# Patient Record
Sex: FEMALE | Race: WHITE
[De-identification: names, ages, dates, MRNs, and addresses within clinical notes are randomized per-mention and may not be internally consistent; named-entity substitution may affect disease eponyms.]

---

## 2017-01-29 ENCOUNTER — HOSPITAL ENCOUNTER (EMERGENCY)
Dept: HOSPITAL 17 - PHED | Age: 67
Discharge: HOME | End: 2017-01-29
Payer: MEDICARE

## 2017-01-29 VITALS
RESPIRATION RATE: 20 BRPM | HEART RATE: 84 BPM | OXYGEN SATURATION: 95 % | TEMPERATURE: 98.6 F | SYSTOLIC BLOOD PRESSURE: 151 MMHG | DIASTOLIC BLOOD PRESSURE: 84 MMHG

## 2017-01-29 VITALS
RESPIRATION RATE: 18 BRPM | HEART RATE: 74 BPM | OXYGEN SATURATION: 94 % | SYSTOLIC BLOOD PRESSURE: 124 MMHG | DIASTOLIC BLOOD PRESSURE: 66 MMHG

## 2017-01-29 VITALS — BODY MASS INDEX: 44.53 KG/M2 | HEIGHT: 63 IN | WEIGHT: 251.33 LBS

## 2017-01-29 DIAGNOSIS — Z85.820: ICD-10-CM

## 2017-01-29 DIAGNOSIS — Z86.711: ICD-10-CM

## 2017-01-29 DIAGNOSIS — Z79.01: ICD-10-CM

## 2017-01-29 DIAGNOSIS — B96.1: ICD-10-CM

## 2017-01-29 DIAGNOSIS — Z79.899: ICD-10-CM

## 2017-01-29 DIAGNOSIS — Z85.42: ICD-10-CM

## 2017-01-29 DIAGNOSIS — N30.00: ICD-10-CM

## 2017-01-29 DIAGNOSIS — J09.X2: Primary | ICD-10-CM

## 2017-01-29 LAB
ALP SERPL-CCNC: 89 U/L (ref 45–117)
ALT SERPL-CCNC: 18 U/L (ref 10–53)
ANION GAP SERPL CALC-SCNC: 7 MEQ/L (ref 5–15)
APTT BLD: 34.2 SEC (ref 24.3–30.1)
AST SERPL-CCNC: 34 U/L (ref 15–37)
BACTERIA #/AREA URNS HPF: (no result) /HPF
BASOPHILS # BLD AUTO: 0.1 TH/MM3 (ref 0–0.2)
BASOPHILS NFR BLD: 1.4 % (ref 0–2)
BILIRUB SERPL-MCNC: 0.3 MG/DL (ref 0.2–1)
BUN SERPL-MCNC: 18 MG/DL (ref 7–18)
CHLORIDE SERPL-SCNC: 108 MEQ/L (ref 98–107)
COLOR UR: YELLOW
COMMENT (UR): (no result)
CULTURE IF INDICATED: (no result)
EOSINOPHIL # BLD: 0 TH/MM3 (ref 0–0.4)
EOSINOPHIL NFR BLD: 0.6 % (ref 0–4)
ERYTHROCYTE [DISTWIDTH] IN BLOOD BY AUTOMATED COUNT: 13.6 % (ref 11.6–17.2)
GFR SERPLBLD BASED ON 1.73 SQ M-ARVRAT: 86 ML/MIN (ref 89–?)
GLUCOSE UR STRIP-MCNC: (no result) MG/DL
HCO3 BLD-SCNC: 27.6 MEQ/L (ref 21–32)
HCT VFR BLD CALC: 38.7 % (ref 35–46)
HEMO FLAGS: (no result)
HGB UR QL STRIP: (no result)
INR PPP: 1.8 RATIO
KETONES UR STRIP-MCNC: (no result) MG/DL
LYMPHOCYTES # BLD AUTO: 1.7 TH/MM3 (ref 1–4.8)
LYMPHOCYTES NFR BLD AUTO: 44.3 % (ref 9–44)
MCH RBC QN AUTO: 29.3 PG (ref 27–34)
MCHC RBC AUTO-ENTMCNC: 34.2 % (ref 32–36)
MCV RBC AUTO: 85.9 FL (ref 80–100)
METHOD OF COLLECTION: (no result)
MONOCYTES NFR BLD: 13 % (ref 0–8)
NEUTROPHILS # BLD AUTO: 1.5 TH/MM3 (ref 1.8–7.7)
NEUTROPHILS NFR BLD AUTO: 40.7 % (ref 16–70)
NITRITE UR QL STRIP: (no result)
PLATELET # BLD: 249 TH/MM3 (ref 150–450)
POTASSIUM SERPL-SCNC: 3.9 MEQ/L (ref 3.5–5.1)
PROTHROMBIN TIME: 20.7 SEC (ref 9.8–11.6)
RBC # BLD AUTO: 4.5 MIL/MM3 (ref 4–5.3)
RBC #/AREA URNS HPF: (no result) /HPF (ref 0–3)
SODIUM SERPL-SCNC: 143 MEQ/L (ref 136–145)
SP GR UR STRIP: 1.02 (ref 1–1.03)
SQUAMOUS #/AREA URNS HPF: (no result) /HPF (ref 0–5)
WBC # BLD AUTO: 3.8 TH/MM3 (ref 4–11)

## 2017-01-29 PROCEDURE — 81001 URINALYSIS AUTO W/SCOPE: CPT

## 2017-01-29 PROCEDURE — 96360 HYDRATION IV INFUSION INIT: CPT

## 2017-01-29 PROCEDURE — 71020: CPT

## 2017-01-29 PROCEDURE — 80053 COMPREHEN METABOLIC PANEL: CPT

## 2017-01-29 PROCEDURE — 87186 SC STD MICRODIL/AGAR DIL: CPT

## 2017-01-29 PROCEDURE — 85730 THROMBOPLASTIN TIME PARTIAL: CPT

## 2017-01-29 PROCEDURE — 87077 CULTURE AEROBIC IDENTIFY: CPT

## 2017-01-29 PROCEDURE — 87086 URINE CULTURE/COLONY COUNT: CPT

## 2017-01-29 PROCEDURE — 93005 ELECTROCARDIOGRAM TRACING: CPT

## 2017-01-29 PROCEDURE — 87040 BLOOD CULTURE FOR BACTERIA: CPT

## 2017-01-29 PROCEDURE — 85025 COMPLETE CBC W/AUTO DIFF WBC: CPT

## 2017-01-29 PROCEDURE — 99284 EMERGENCY DEPT VISIT MOD MDM: CPT

## 2017-01-29 PROCEDURE — 85610 PROTHROMBIN TIME: CPT

## 2017-01-29 PROCEDURE — 87804 INFLUENZA ASSAY W/OPTIC: CPT

## 2017-01-29 NOTE — EKG
Date Performed: 01/29/2017       Time Performed: 14:41:10

 

PTAGE:      66 years

 

EKG:      Sinus rhythm 

 

 Normal ECG

 

PREVIOUS TRACING       : 01/08/2015 13.38 No significant change from previous tracing noted.

 

DOCTOR:   Yeison Meek  Interpretating Date/Time  01/29/2017 17:53:20

## 2017-01-29 NOTE — PD
HPI


Chief Complaint:  Cold / Flu Symptoms


Time Seen by Provider:  13:59


Travel History


International Travel<30 days:  No


Contact w/Intl Traveler<30days:  No


Traveled to known affect area:  No





History of Present Illness


HPI


Ms. Nugent is a pleasant 66 year old female who presents to ER with c/o of cough/

congestion and overall "not feeling well" for the past week (5 days ago).  

Patient reports that all her symptoms began last week after her her  

passed. Reports that she had alot of family members come in from out of town 

for the [facility] service and reports that some of her family members were sick. 

Reports that alot of her family member had cough/congestion and patient thinks 

that someone must have gotten her sick. Reports no fever/chills. Reports no 

chest pain or sob. Reports that she has been coughing - reports cough as 

nonproductive.  Reports "i just feel like my chest is congested."





PFSH


Past Medical History


Arthritis:  Yes


Asthma:  No


Blood Disorders:  No


Anxiety:  No


Depression:  Yes


Heart Rhythm Problems:  No


Cancer:  Yes (MELANOMA 2015, ENDOMETRIAL CANCER, COMPLETE HYSTERECTOMY 2009)


Cardiovascular Problems:  No


High Cholesterol:  No


Chemotherapy:  No


Chest Pain:  No


Congestive Heart Failure:  No


COPD:  No


Cerebrovascular Accident:  No


Diabetes:  No


Diminished Hearing:  No


Endocrine:  No


Gastrointestinal Disorders:  Yes


GERD:  No


Glaucoma:  No


Genitourinary:  Yes (HX OF UTI'S)


Headaches:  No


Hepatitis:  No


Hiatal Hernia:  Yes (HX OF, CAUSES NO PROBLEMS)


Hypertension:  No


Immune Disorder:  No


Implanted Vascular Access Dvce:  No


Kidney Stones:  No


Medical other:  Yes (LESIONS REMOVED FROM VULVA JAN. 2015)


Musculoskeletal:  Yes (ARTHRITIS)


Neurologic:  Yes (HX OF VERTIGO)


Psychiatric:  No


Reproductive:  No


Respiratory:  Yes (HX OF MASSIVE PULMONARY EMBOLISM  2001, RECURRED WHEN OFF 

COUMADIN 2014)


Migraines:  No


Myocardial Infarction:  No


Radiation Therapy:  No


Renal Failure:  No


Seizures:  No


Sleep Apnea:  No


Thyroid Disease:  Yes (HX OF HYPOTHYROID, NOT CURRENT)


Ulcer:  No


Menopausal:  Yes


Dilation and Curettage (D&C):  Yes (2002 2009)





Past Surgical History


Abdominal Surgery:  Yes (UMBILICAL HERNIA REPAIR 2000)


AICD:  No


Appendectomy:  No


Arteriovenous Shunt:  No


Body Medical Devices:  PESARY


Cardiac Surgery:  No


Cholecystectomy:  No


Ear Surgery:  No


Endocrine Surgery:  No


Eye Surgery:  Yes (CATARACTS REMOVED 2014)


Genitourinary Surgery:  Yes (LESIONS REMOVED FROM VULVA JAN 2015)


Gynecologic Surgery:  Yes (TOTAL HYSTERECTOMY, D & C 2002; D&C 5/09)


Hysterectomy:  Yes


Insulin Pump:  No


Joint Replacement:  Yes (L HIP)


Neurologic Surgery:  No


Oral Surgery:  No


Pacemaker:  No


Thoracic Surgery:  No


Other Surgery:  Yes





Social History


Alcohol Use:  No


Tobacco Use:  No


Substance Use:  No





Allergies-Medications


(Allergen,Severity, Reaction):  


Coded Allergies:  


     No Known Allergies (Verified , 12/2/15)


Reported Meds & Prescriptions





Reported Meds & Active Scripts


Active


Reported


Warfarin 3 Mg Tab 3.5 Mg PO DAILY


Simvastatin 10 Mg Tab 10 Mg PO DAILY


Lisinopril 5 Mg Tab 5 Mg PO DAILY


Fluoxetine (Fluoxetine HCl) 20 Mg Tab 20 Mg PO DAILY








Review of Systems


General / Constitutional:  No: Fever, Chills


Eyes:  No: Visual changes


HENT:  Positive: Rhinorrhea,  No: Headaches, Sore Throat


Cardiovascular:  No: Chest Pain or Discomfort


Respiratory:  Positive: Cough,  No: Shortness of Breath


Gastrointestinal:  No: Nausea, Vomiting, Diarrhea, Abdominal Pain


Genitourinary:  No: Dysuria


Musculoskeletal:  No: Pain


Skin:  No Rash


Neurologic:  No: Weakness


Psychiatric:  No: Depression


Endocrine:  No: Polydipsia


Hematologic/Lymphatic:  No: Easy Bruising





Physical Exam


Narrative


GENERAL: nad, nontoxic


SKIN: Warm and dry.


HEAD: Atraumatic. Normocephalic. 


EYES: Pupils equal and round. . No injection or drainage. 


ENT: No nasal bleeding or discharge.  Mucous membranes pink and moist.


NECK: Trachea midline. No JVD. 


CARDIOVASCULAR: Regular rate and rhythm.  No murmur appreciated.


RESPIRATORY: No accessory muscle use. Clear to auscultation. Breath sounds 

equal bilaterally. 


GASTROINTESTINAL: Abdomen soft, non-tender, nondistended. Hepatic and splenic 

margins not palpable. 


MUSCULOSKELETAL: No obvious deformities. No clubbing.  No cyanosis.  No edema. 


NEUROLOGICAL: Awake and alert. Motor grossly within normal limits. Normal 

speech.


PSYCHIATRIC: Appropriate mood and affect; insight and judgment normal.





Data


Data


Last Documented VS





Vital Signs








  Date Time  Temp Pulse Resp B/P Pulse Ox O2 Delivery O2 Flow Rate FiO2


 


1/29/17 13:26 98.6 84 20 151/84 95   








Orders





 Electrocardiogram (1/29/17 14:15)


Complete Blood Count With Diff (1/29/17 14:15)


Comprehensive Metabolic Panel (1/29/17 14:15)


Influenzae A/B Antigen (1/29/17 14:15)


Urinalysis - C+S If Indicated (1/29/17 14:15)


Blood Culture (1/29/17 14:15)


Chest, Pa & Lat (1/29/17 14:15)


Ecg Monitoring (1/29/17 14:15)


Iv Access Insert/Monitor (1/29/17 14:15)


Oximetry (1/29/17 14:15)


Sodium Chloride 0.9% Flush (Ns Flush) (1/29/17 14:15)


Sodium Chlor 0.9% 1000 Ml Inj (Ns 1000 M (1/29/17 14:15)


Prothrombin Time / Inr (Pt) (1/29/17 14:51)


Act Partial Throm Time (Ptt) (1/29/17 14:51)


Urine Culture (1/29/17 14:30)





Labs





 Laboratory Tests








Test 1/29/17 1/29/17





 14:30 14:50


 


Urine Collection Type CLEAN CATCH  


 


Urine Color YELLOW  


 


Urine Turbidity CLEAR  


 


Urine pH 6.0  


 


Urine Specific Gravity 1.025  


 


Urine Protein 30 mg/dL 


 


Urine Glucose (UA) NEG mg/dL 


 


Urine Ketones NEG mg/dL 


 


Urine Occult Blood SMALL  


 


Urine Nitrite POS  


 


Urine Bilirubin NEG  


 


Urine Leukocyte Esterase NEG  


 


Urine RBC 0-3 /hpf 


 


Urine WBC 9-14 /hpf 


 


Urine Squamous Epithelial 0-5 /hpf 





Cells  


 


Urine Bacteria FEW /hpf 


 


Microscopic Urinalysis Comment CULTURE 





 INDICATED 


 


White Blood Count  3.8 TH/MM3


 


Red Blood Count  4.50 MIL/MM3


 


Hemoglobin  13.2 GM/DL


 


Hematocrit  38.7 %


 


Mean Corpuscular Volume  85.9 FL


 


Mean Corpuscular Hemoglobin  29.3 PG


 


Mean Corpuscular Hemoglobin  34.2 %





Concent  


 


Red Cell Distribution Width  13.6 %


 


Platelet Count  249 TH/MM3


 


Mean Platelet Volume  7.4 FL


 


Neutrophils (%) (Auto)  40.7 %


 


Lymphocytes (%) (Auto)  44.3 %


 


Monocytes (%) (Auto)  13.0 %


 


Eosinophils (%) (Auto)  0.6 %


 


Basophils (%) (Auto)  1.4 %


 


Neutrophils # (Auto)  1.5 TH/MM3


 


Lymphocytes # (Auto)  1.7 TH/MM3


 


Monocytes # (Auto)  0.5 TH/MM3


 


Eosinophils # (Auto)  0.0 TH/MM3


 


Basophils # (Auto)  0.1 TH/MM3


 


CBC Comment  DIFF FINAL 


 


Differential Comment   


 


Prothrombin Time  20.7 SEC


 


Prothromb Time International  1.8 RATIO





Ratio  


 


Activated Partial  34.2 SEC





Thromboplast Time  


 


Sodium Level  143 MEQ/L


 


Potassium Level  3.9 MEQ/L


 


Chloride Level  108 MEQ/L


 


Carbon Dioxide Level  27.6 MEQ/L


 


Anion Gap  7 MEQ/L


 


Blood Urea Nitrogen  18 MG/DL


 


Creatinine  0.68 MG/DL


 


Estimat Glomerular Filtration  86 ML/MIN





Rate  


 


Random Glucose  91 MG/DL


 


Calcium Level  8.4 MG/DL


 


Total Bilirubin  0.3 MG/DL


 


Aspartate Amino Transf  34 U/L





(AST/SGOT)  


 


Alanine Aminotransferase  18 U/L





(ALT/SGPT)  


 


Alkaline Phosphatase  89 U/L


 


Total Protein  7.2 GM/DL


 


Albumin  3.1 GM/DL











[facility]


Medical Decision Making


Medical Screen Exam Complete:  Yes


Emergency Medical Condition:  Yes


Interpretation(s)


EKG that 1441: Normal sinus rhythm at 77 bpm, QT/QTc 402/431, no acute ST-T 

wave changes


Differential Diagnosis


Pneumonia, influenza, electrolyte abnormality, viral syndrome, acute bronchitis


Narrative Course


Patient is a 66-year-old female who presents to emergency room with complaints 

of not feeling well for the past 5 days.  She reports that she has had 

increased cough and congestion with a nonproductive cough for the past 5 days.  

Patient with no fevers or chills.  Patient denies chest pain or shortness of 

breath.  Patient reports that she has been around sick family members as her 

 passed away last week. Reports "I probably got sick from them."  

Patient with no recent travels/trips. NO other c/o. 





ekg ordered





iv placed, cbc, bmp, ua, influenza and blood cultures ordered.  will continue 

to monitor patient





Chest x-ray: Moderate stable degenerative changes in thoracic spine, no 

evidence of mass, infiltrate or effusion.





UA positive for nitrites, 9-14 white blood cells, few bacteria - urine culture 

sent.  We'll give patient a dose of Rocephin for treatment of UTI.





Influenza A positive





cbc:


wbc: 3.8


hct: 38.7


hgb: 13.2


platelets: 249





cmp:


sodium 143


potassium 3.9


chloride: 108


carbon dioxide: 27.6


bun: 18


cr: 0.68








Patient re-evaluated


patient feeling much better. 


pt will follow up with all cultures from today


pt will follow up with pcp and will return to ER as needed.





pt's INR is 1.8 which is subtherapeutic, she will call her pcp about 

subtherapeutic INR





Diagnosis





 Primary Impression:  


 Influenza A


 Additional Impressions:  


 Subtherapeutic international normalized ratio (INR)


 UTI (urinary tract infection)


 Qualified Code:  N30.00 - Acute cystitis without hematuria


Patient Instructions:  General Instructions





***Additional Instructions:


Please make sure you drink plenty of fluids





Please follow-up with all cultures from today





Please call your primary care doctor for earliest follow-up appointment.





Please let your primary care doctor know that you have a subtherapeutic INR. 

Your INR today was 1.8 and this is NOT therapeutic





Please return to the emergency room if symptoms progress or worsen.


***Med/Other Pt SpecificInfo:  Prescription(s) given


Scripts


Ibuprofen 600 Mg [id number] Mg PO Q6H PRN (Pain/Inflammation) #40 TAB  Ref 0


   Prov:Deneen Soto DO         1/29/17 


Promethazine-Codeine Liq 6.25-10 Mg/5 Ml Syrp5 Ml PO Q6H PRN (COUGH AND/OR COLD 

SYMPTOMS) 10 Days  Ref 0


   Prov:Deneen Soto DO         1/29/17 


Benzonatate (Tessalon Perles)100 Mg Lyx507 Mg PO TID PRN (COUGH) #30 CAP  Ref 0


   Prov:Deneen Soto DO         1/29/17 


Nitrofurantoin Monohydrate Macrocrystals (Macrobid)100 Mg [id number] Mg PO BID  10 

Days  Ref 0


   Prov:Deneen Soto DO         1/29/17


Disposition:  01 DISCHARGE HOME


Condition:  Stable








Deneen Soto DO Jan 29, 2017 14:18

## 2017-01-29 NOTE — RADHPO
EXAM DATE/TIME:  01/29/2017 14:31 

 

[facility] COMPARISON:     

CHEST PA & LAT, January 08, 2015, 14:22.

 

                     

INDICATIONS :     

Cough, chest congestion

                     

 

MEDICAL HISTORY :     

None.          

 

SURGICAL HISTORY :     

None.   

 

ENCOUNTER:     

Initial                                        

 

ACUITY:     

4 - 6 days      

 

PAIN SCORE:     

0/10

 

LOCATION:     

Bilateral chest 

 

FINDINGS:     

PA and lateral views of the chest demonstrate the lungs to be symmetrically aerated without evidence 
of mass, infiltrate or effusion.  The cardiomediastinal contours are unremarkable.  Osseous structure
s are intact. Moderate stable degenerative changes of the thoracic spine.

 

CONCLUSION:     

No acute disease.  No significant change has occurred.  

 

 

 

 Juancarlos Mathis MD on January 29, 2017 at 14:43           

Board Certified Radiologist.

 This report was verified electronically.

## 2018-05-03 NOTE — MB
cc:

STEPHEN Rodgers MD,Desmond Hankins MD, DO

****

 

 

DATE:

2018

 

HISTORY OF PRESENT ILLNESS:

Ms. Nugent is a 67-year-old white female with a history of melanoma on 

her left arm in .  She had no other associated therapy but has 

been monitored carefully by Dr. Kaufman.  Recently has been feeling a

little bit of discomfort in her anterior chest on the right, a 

generalized fatigue, loss of appetite and about a 5- to 7-pound weight

loss.  She was also complaining of some instability of gait and 

tremulousness, although no headache.  She had a CT of her head, which 

was reported as negative by the patient--I do not have that actual 

report and then a CT of her chest which revealed multiple bilateral 

pulmonary nodules, the largest was about 1 cm, and in addition to 

that, she had bulky right hilar adenopathy characteristic of 

metastatic disease.

 

She has had a minimal cough, no purulent sputum, no hemoptysis.  Not 

particularly short of breath.  She was a former smoker of about 20 

pack-years; quit smoking though in her 40's thirty years ago.

 

She has also had a history of uterine cancer treated by hysterectomy 

in  and a vulvar cancer in , also treated surgically with no 

associated chemotherapy or radiation.

 

She had a pulmonary embolism and a right lower extremity DVT in . 

She was treated for about 6 months with anticoagulants.  She did very 

well with no recurrence until  when she developed a DVT again in 

the right leg after a colonoscopy.  She has been on warfarin since 

then with no recurrence.

 

ADDITIONAL PAST HISTORY:

Hypothyroidism, type 2 diabetes, hernia repair.  No cardiovascular 

history.  No prior history of COPD, emphysema or asthma.

 

ALLERGIES:

NONE KNOWN.

 

MEDICATIONS:

She has been on Cipro recently for a UTI.  She is on lisinopril, 

simvastatin, Prozac, metformin, warfarin and a thyroid replacement.

 

FAMILY HISTORY:

Father  of pneumonia in his 80s.  Mother had a heart attack at 77.

 Lost a sister of unclear etiology, possible pulmonary embolism.  

Another sister alive at 77 and a son who is in good health.

 

SOCIAL HISTORY:

She is  for 1 year.  Ran a nursery school for many years, now 

retired.  Smoking noted.  No alcohol use.  She has 2 dogs at home.  

Lives alone, manages her own affairs.

 

REVIEW OF SYSTEMS:

Weight is down about 5 pounds.  She has had cataracts removed.  No 

anginal chest pain or significant chronic edema.  Appetite has been 

poor.  No significant musculoskeletal complaints.

 

PHYSICAL EXAMINATION:

VITAL SIGNS:  Blood pressure 130/70, pulse 87, temperature 98, RR 18, 

saturation 96% on room air.

HEENT:  Sclerae are anicteric.  Pharynx is clear.

NECK:  Neck veins are flat.  No adenopathy in the neck or 

supraclavicular region.

CHEST:  Clear.  No wheezes, rales or congestion.

HEART:  Regular rhythm.  No harsh murmur.

ABDOMEN:  Obese but soft.  No pitting edema or cyanosis.

 

Scan is reviewed with MsPavel Nugent and I have pointed out the areas of 

lymph node enlargement.  I have suggested she proceed with a 

diagnostic bronchoscopy with ultrasound guidance to sample these lymph

nodes.  The small nodules within the lung are probably not accessible.

 

We have reviewed the procedure in simple terms; discussed potential 

for complications including, although not limited to, anesthetic 

risks, bleeding or pneumothorax.  We have also discussed the 

possibility that the procedure will not provide a definitive 

diagnosis.

 

She is agreeable to proceed.  We are scheduling this as an outpatient 

and I will hold her Coumadin prior to the procedure with appropriate 

laboratories.

 

Further diagnostic and/or therapeutic intervention will depend on the 

results of this initial diagnostic study.

 

 

 

 

__________________________________

R. Michael Rodgers MD

 

 

RSW/JOSEPH/

D: 2018, 12:54 PM

T: 2018, 01:22 PM

Visit #: [id number]

Job #: [id number]

## 2018-05-04 ENCOUNTER — HOSPITAL ENCOUNTER (OUTPATIENT)
Dept: HOSPITAL 17 - CPRE | Age: 68
End: 2018-05-04
Attending: INTERNAL MEDICINE
Payer: MEDICARE

## 2018-05-04 DIAGNOSIS — R91.1: ICD-10-CM

## 2018-05-04 DIAGNOSIS — Z01.810: Primary | ICD-10-CM

## 2018-05-04 DIAGNOSIS — Z01.812: ICD-10-CM

## 2018-05-04 LAB
BUN SERPL-MCNC: 12 MG/DL (ref 7–18)
CALCIUM SERPL-MCNC: 9.1 MG/DL (ref 8.5–10.1)
CHLORIDE SERPL-SCNC: 105 MEQ/L (ref 98–107)
CREAT SERPL-MCNC: 0.67 MG/DL (ref 0.5–1)
ERYTHROCYTE [DISTWIDTH] IN BLOOD BY AUTOMATED COUNT: 15 % (ref 11.6–17.2)
GFR SERPLBLD BASED ON 1.73 SQ M-ARVRAT: 87 ML/MIN (ref 89–?)
HCO3 BLD-SCNC: 26.5 MEQ/L (ref 21–32)
HCT VFR BLD CALC: 38.3 % (ref 35–46)
HGB BLD-MCNC: 13 GM/DL (ref 11.6–15.3)
INR PPP: 2 RATIO
MCH RBC QN AUTO: 29.2 PG (ref 27–34)
MCHC RBC AUTO-ENTMCNC: 33.9 % (ref 32–36)
MCV RBC AUTO: 86.3 FL (ref 80–100)
PLATELET # BLD: 332 TH/MM3 (ref 150–450)
PMV BLD AUTO: 7.3 FL (ref 7–11)
PROTHROMBIN TIME: 20.6 SEC (ref 9.8–11.6)
RBC # BLD AUTO: 4.43 MIL/MM3 (ref 4–5.3)
SODIUM SERPL-SCNC: 141 MEQ/L (ref 136–145)
WBC # BLD AUTO: 7.1 TH/MM3 (ref 4–11)

## 2018-05-04 PROCEDURE — 80048 BASIC METABOLIC PNL TOTAL CA: CPT

## 2018-05-04 PROCEDURE — 85027 COMPLETE CBC AUTOMATED: CPT

## 2018-05-04 PROCEDURE — 85730 THROMBOPLASTIN TIME PARTIAL: CPT

## 2018-05-04 PROCEDURE — 85610 PROTHROMBIN TIME: CPT

## 2018-05-04 PROCEDURE — 93005 ELECTROCARDIOGRAM TRACING: CPT

## 2018-05-04 PROCEDURE — 36415 COLL VENOUS BLD VENIPUNCTURE: CPT

## 2018-05-04 NOTE — EKG
Date Performed: 05/04/2018       Time Performed: 10:53:23

 

PTAGE:      67 years

 

EKG:      Sinus rhythm 

 

 NORMAL ECG

 

PREVIOUS TRACING       : 01/29/2017 14.41

 

DOCTOR:   Kaley Gregorio  Interpretating Date/Time  05/04/2018 16:39:27

## 2018-05-08 ENCOUNTER — HOSPITAL ENCOUNTER (OUTPATIENT)
Dept: HOSPITAL 17 - CLAB | Age: 68
End: 2018-05-08
Attending: INTERNAL MEDICINE
Payer: MEDICARE

## 2018-05-08 DIAGNOSIS — R91.1: Primary | ICD-10-CM

## 2018-05-08 LAB
INR PPP: 1.1 RATIO
PROTHROMBIN TIME: 11.2 SEC (ref 9.8–11.6)

## 2018-05-08 PROCEDURE — 85730 THROMBOPLASTIN TIME PARTIAL: CPT

## 2018-05-08 PROCEDURE — 36415 COLL VENOUS BLD VENIPUNCTURE: CPT

## 2018-05-08 PROCEDURE — 85610 PROTHROMBIN TIME: CPT

## 2018-05-15 ENCOUNTER — HOSPITAL ENCOUNTER (INPATIENT)
Dept: HOSPITAL 17 - HROP | Age: 68
LOS: 1 days | Discharge: HOME | DRG: 309 | End: 2018-05-16
Attending: HOSPITALIST | Admitting: HOSPITALIST
Payer: MEDICARE

## 2018-05-15 VITALS — WEIGHT: 233.91 LBS | BODY MASS INDEX: 41.45 KG/M2 | HEIGHT: 63 IN

## 2018-05-15 VITALS — HEART RATE: 52 BPM

## 2018-05-15 VITALS
TEMPERATURE: 98 F | SYSTOLIC BLOOD PRESSURE: 103 MMHG | OXYGEN SATURATION: 97 % | DIASTOLIC BLOOD PRESSURE: 53 MMHG | HEART RATE: 65 BPM

## 2018-05-15 VITALS — HEART RATE: 56 BPM

## 2018-05-15 VITALS — HEART RATE: 64 BPM

## 2018-05-15 VITALS — HEART RATE: 62 BPM

## 2018-05-15 DIAGNOSIS — Z79.01: ICD-10-CM

## 2018-05-15 DIAGNOSIS — I95.9: ICD-10-CM

## 2018-05-15 DIAGNOSIS — Z79.84: ICD-10-CM

## 2018-05-15 DIAGNOSIS — Z87.891: ICD-10-CM

## 2018-05-15 DIAGNOSIS — Z85.820: ICD-10-CM

## 2018-05-15 DIAGNOSIS — R91.8: ICD-10-CM

## 2018-05-15 DIAGNOSIS — I11.9: ICD-10-CM

## 2018-05-15 DIAGNOSIS — Z86.711: ICD-10-CM

## 2018-05-15 DIAGNOSIS — E86.0: ICD-10-CM

## 2018-05-15 DIAGNOSIS — E78.00: ICD-10-CM

## 2018-05-15 DIAGNOSIS — Z82.49: ICD-10-CM

## 2018-05-15 DIAGNOSIS — R00.0: ICD-10-CM

## 2018-05-15 DIAGNOSIS — E11.9: ICD-10-CM

## 2018-05-15 DIAGNOSIS — R59.0: ICD-10-CM

## 2018-05-15 DIAGNOSIS — R63.4: ICD-10-CM

## 2018-05-15 DIAGNOSIS — E03.9: ICD-10-CM

## 2018-05-15 DIAGNOSIS — I48.0: Primary | ICD-10-CM

## 2018-05-15 LAB
BUN SERPL-MCNC: 14 MG/DL (ref 7–18)
CALCIUM SERPL-MCNC: 8 MG/DL (ref 8.5–10.1)
CHLORIDE SERPL-SCNC: 110 MEQ/L (ref 98–107)
CREAT SERPL-MCNC: 0.65 MG/DL (ref 0.5–1)
ERYTHROCYTE [DISTWIDTH] IN BLOOD BY AUTOMATED COUNT: 15.4 % (ref 11.6–17.2)
GFR SERPLBLD BASED ON 1.73 SQ M-ARVRAT: 90 ML/MIN (ref 89–?)
GLUCOSE SERPL-MCNC: 111 MG/DL (ref 74–106)
HCO3 BLD-SCNC: 22.8 MEQ/L (ref 21–32)
HCT VFR BLD CALC: 35.4 % (ref 35–46)
HGB BLD-MCNC: 11.6 GM/DL (ref 11.6–15.3)
MCH RBC QN AUTO: 28.9 PG (ref 27–34)
MCHC RBC AUTO-ENTMCNC: 32.9 % (ref 32–36)
MCV RBC AUTO: 88 FL (ref 80–100)
PLATELET # BLD: 244 TH/MM3 (ref 150–450)
PMV BLD AUTO: 7.3 FL (ref 7–11)
RBC # BLD AUTO: 4.02 MIL/MM3 (ref 4–5.3)
SODIUM SERPL-SCNC: 143 MEQ/L (ref 136–145)
WBC # BLD AUTO: 12.8 TH/MM3 (ref 4–11)

## 2018-05-15 PROCEDURE — 88112 CYTOPATH CELL ENHANCE TECH: CPT

## 2018-05-15 PROCEDURE — 87206 SMEAR FLUORESCENT/ACID STAI: CPT

## 2018-05-15 PROCEDURE — 0BDD8ZX EXTRACTION OF RIGHT MIDDLE LUNG LOBE, VIA NATURAL OR ARTIFICIAL OPENING ENDOSCOPIC, DIAGNOSTIC: ICD-10-PCS | Performed by: INTERNAL MEDICINE

## 2018-05-15 PROCEDURE — 85027 COMPLETE CBC AUTOMATED: CPT

## 2018-05-15 PROCEDURE — 0BDF8ZX EXTRACTION OF RIGHT LOWER LUNG LOBE, VIA NATURAL OR ARTIFICIAL OPENING ENDOSCOPIC, DIAGNOSTIC: ICD-10-PCS | Performed by: INTERNAL MEDICINE

## 2018-05-15 PROCEDURE — 85610 PROTHROMBIN TIME: CPT

## 2018-05-15 PROCEDURE — 71250 CT THORAX DX C-: CPT

## 2018-05-15 PROCEDURE — 93005 ELECTROCARDIOGRAM TRACING: CPT

## 2018-05-15 PROCEDURE — 85025 COMPLETE CBC W/AUTO DIFF WBC: CPT

## 2018-05-15 PROCEDURE — 87116 MYCOBACTERIA CULTURE: CPT

## 2018-05-15 PROCEDURE — 87015 SPECIMEN INFECT AGNT CONCNTJ: CPT

## 2018-05-15 PROCEDURE — 31652 BRONCH EBUS SAMPLNG 1/2 NODE: CPT

## 2018-05-15 PROCEDURE — 83735 ASSAY OF MAGNESIUM: CPT

## 2018-05-15 PROCEDURE — 31625 BRONCHOSCOPY W/BIOPSY(S): CPT

## 2018-05-15 PROCEDURE — 71045 X-RAY EXAM CHEST 1 VIEW: CPT

## 2018-05-15 PROCEDURE — 87070 CULTURE OTHR SPECIMN AEROBIC: CPT

## 2018-05-15 PROCEDURE — 80048 BASIC METABOLIC PNL TOTAL CA: CPT

## 2018-05-15 PROCEDURE — 82948 REAGENT STRIP/BLOOD GLUCOSE: CPT

## 2018-05-15 PROCEDURE — 94664 DEMO&/EVAL PT USE INHALER: CPT

## 2018-05-15 PROCEDURE — 87205 SMEAR GRAM STAIN: CPT

## 2018-05-15 PROCEDURE — 88305 TISSUE EXAM BY PATHOLOGIST: CPT

## 2018-05-15 PROCEDURE — 87102 FUNGUS ISOLATION CULTURE: CPT

## 2018-05-15 RX ADMIN — STANDARDIZED SENNA CONCENTRATE AND DOCUSATE SODIUM SCH TAB: 8.6; 5 TABLET, FILM COATED ORAL at 20:59

## 2018-05-15 RX ADMIN — INSULIN ASPART SCH: 100 INJECTION, SOLUTION INTRAVENOUS; SUBCUTANEOUS at 21:00

## 2018-05-15 NOTE — RADRPT
EXAM DATE/TIME:  05/15/2018 16:16 

 

[facility] COMPARISON:     

No previous studies available for comparison.

 

                     

INDICATIONS :     

Right post bronch, right lung biopsy.

                     

 

MEDICAL HISTORY :     

None.          

 

SURGICAL HISTORY :     

None.   

 

ENCOUNTER:     

Initial                                        

 

ACUITY:     

1 day      

 

PAIN SCORE:     

0/10

 

LOCATION:     

Bilateral  chest

 

FINDINGS:     

There is an approximate 6 cm right perihilar mass with mild interstitial and alveolar opacities in th
e right lower lobe. No significant pneumothorax. Cardiac size within normal limits. Bony thorax is in
tact.

 

CONCLUSION:     

1. No significant pneumothorax status post bronchoscopy.

2. Approximately 6 cm right perihilar lung mass.

 

 

 

 Jean Ordoñez MD on May 15, 2018 at 16:44           

Board Certified Radiologist.

 This report was verified electronically.

## 2018-05-15 NOTE — MR
cc:

STEPHEN Rodgers MD

****

 

 

DATE:

05/15/2018

 

PROCEDURE PERFORMED:

Bronchoscopy.

 

INDICATIONS FOR PROCEDURE:

Mediastinal adenopathy with a history of melanoma.

 

After informed consent was obtained, the patient underwent diagnostic 

bronchoscopy with general anesthesia.

 

Initial examination of the airways revealed a normal mid to lower 

trachea.

 

Examination of the left main stem bronchus, left upper and lower lobes

was normal.

 

Examination of the right main stem bronchus was normal down to the 

takeoff of the right upper lobe, but then there was some narrowing 

from extrinsic compression at that point.  The right middle lobe was 

patent.  Further examination in the right lower lobe, medial basilar 

segment revealed what appeared to be an endobronchial tumor extruding 

into the airway.  This area was washed extensively and submitted for 

cytology and culture.  Needle aspiration was then obtained from the 

lesion twice and there was fairly brisk bleeding after that.

 

The area was lavaged with cold saline and a dilute adrenaline solution

and the bleeding was controlled.

 

Following this, utilizing endobronchial ultrasound, the mediastinal 

mass was clearly identified.  Multiple needle aspirations were 

obtained for cytology.  Minimal bleeding noted.

 

At the end of the procedure, there was no active bleeding.

 

Specimens submitted from the station VII lymph node region where the 

mass was identified on CT for cytology.  Washings and 2 needle 

aspirations of the right lower lobe were also submitted for cytology. 

Cultures were also obtained.

 

She tolerated the procedure well.

 

Near the end of the procedure.  She developed a short 1 or 2 minute 

run of atrial fibrillation.  O2 saturations and blood pressure was 

normal.

 

Post-extubation again with normal saturations.  She had a rapid atrial

arrhythmia with a heart rate of about 160-180.  She received esmolol 

from the anesthesiologist and the rate was controlled.  She converted 

to sinus.

 

A chest x-ray and EKG are ordered for the recovery area.  Depending on

the results of these, we will determine whether she can be discharged 

this evening or has to remain overnight for observation.

 

I also spoke to the cardiologist on call.  Recommended that we monitor

her here in Recovery and if the rhythm is stable, administer 120 mg of

slow-release diltiazem, place her on that daily and consider followup 

for evaluation of arrhythmia.

 

Further diagnostic and/or therapeutic intervention will depend on her 

period of recovery.

 

 

__________________________________

R. MD HATTIE Queen/NAVNEET

D: 05/15/2018, 04:08 PM

T: 05/15/2018, 04:39 PM

Visit #: [id number]

Job #: [id number]

## 2018-05-15 NOTE — HHI.HP
__________________________________________________





[facility] [place]


Service


[facility]


Primary Care Physician


Desmond Mcintyre, 


Admission Diagnosis


Atrial fibrillation with RVR


Diagnoses:  


(1) Atrial fibrillation with RVR


(2) Diabetes mellitus


(3) Hypothyroidism


(4) History of pulmonary embolus (PE)


Chief Complaint:  


Lung mass


Travel History


International Travel<30 Days:  No


Contact w/Intl Traveler <30 Da:  No


History of Present Illness


The patient is a 67-year-old female who was brought to the hospital for 

bronchoscopy, done by Dr. Rodgers today.  Patient developed atrial fibrillation 

with RVR during the procedure.  She was given esmolol and return to sinus 

rhythm.  She again went back to [facility] with RVR.  Hospitalist was requested to 

admit the patient.  Dr. Loza, cardiology, was also contacted by the 

pulmonologist.  The patient is seen in PACU.  She denies chest pain.  Does have 

some shortness of breath, which she states has been going on for the past week 

or so.  She reports nausea, but no vomiting.





Review of Systems


Constitutional:  DENIES: Fever, Chills, Night Sweats


Eyes:  DENIES: Blurred vision, Vision loss


Ears, nose, mouth, throat:  DENIES: Hearing loss


Respiratory:  COMPLAINS OF: Shortness of breath, DENIES: Cough, Wheezing, 

Sputum production


Cardiovascular:  DENIES: Chest pain, Palpitations, Dyspnea on Exertion, Lower 

Extremity Edema


Gastrointestinal:  COMPLAINS OF: Nausea, DENIES: Abdominal pain, Constipation, 

Diarrhea, Vomiting


Genitourinary:  DENIES: Urinary frequency, Urinary incontinence, Urgency, 

Hematuria, Dysuria, Nocturia


Musculoskeletal:  DENIES: Joint pain, Muscle aches


Integumentary:  DENIES: Pruritus, Rash


Hematologic/lymphatic:  DENIES: Bruising


Neurologic:  DENIES: Headache





Past Family Social History


Past Medical History


Diabetes mellitus


Hypothyroidism


History of PE


History of melanoma


Past Surgical History


Hysterectomy


Surgical excision of vulvar cancer


Reported Medications


Lisinopril


Simvastatin


Prozac


Metformin


Warfarin


Synthroid


Allergies:  


Coded Allergies:  


     No Known Allergies (Verified  Allergy, Unknown, 5/15/18)


Family History


Heart disease


Social History


Patient quit smoking 30 years ago.  Denies alcohol or illicit drug use.





Physical Exam


Vital Signs





Vital Signs








  Date Time  Temp Pulse Resp B/P (MAP) Pulse Ox O2 Delivery O2 Flow Rate FiO2


 


5/15/18 16:10 97.5 133 24 157/82 (107) 93 Nasal Cannula 4 


 


5/15/18 15:45  98      


 


5/15/18 11:26      Room Air  








Physical Exam


GENERAL: Well-nourished, well-developed female in no acute distress. 


HEENT: Normocephalic, atraumatic. Pupils equal, round and reactive. Extraocular 

movements intact. No scleral icterus. No injection or drainage. Oropharynx is 

clear. Mucous membranes are dry. 


CARDIOVASCULAR: Tachycardic, irregular. 


RESPIRATORY: Clear to auscultation. No wheezes, rales, or rhonchi. Breathing is 

non-labored. 


GASTROINTESTINAL: Abdomen soft, non-tender, nondistended.  


EXTREMITIES: No lower extremity edema. No calf tenderness.


PSYCH: Alert and oriented x 3.


Laboratory











 Date/Time


Source Procedure


Growth Status


 


 


 5/15/18 14:51


Bronchial Washings Right Lower Lobe Fungal Smear


Pending Received


 


 5/15/18 14:51


Bronchial Washings Right Lower Lobe Fungal Culture


Pending Received








Imaging





Last Impressions








Chest X-Ray 5/15/18 0000 Signed





Impressions: 





 Service Date/Time:  Tuesday, May 15, 2018 16:16 - CONCLUSION:  1. No 

significant 





 pneumothorax status post bronchoscopy. 2. Approximately 6 cm right perihilar 





 lung mass.     Alireza Bozorgmanesh, MD Caprini VTE Risk Assessment


Caprini VTE Risk Assessment:  Mod/High Risk (score >= 2)


VTE Pharm Contraindication:  High risk for bleeding


Caprini Risk Assessment Model











 Point Value = 1          Point Value = 2  Point Value = 3  Point Value = 5


 


Age 41-60


Minor surgery


BMI > 25 kg/m2


Swollen legs


Varicose veins


Pregnancy or postpartum


History of unexplained or recurrent


   spontaneous 


Oral contraceptives or hormone


   replacement


Sepsis (< 1 month)


Serious lung disease, including


   pneumonia (< 1 month)


Abnormal pulmonary function


Acute myocardial infarction


Congestive heart failure (< 1 month)


History of inflammatory bowel disease


Medical patient at bed rest Age 61-74


Arthroscopic surgery


Major open surgery (> 45 min)


Laparoscopic surgery (> 45 min)


Malignancy


Confined to bed (> 72 hours)


Immobilizing plaster cast


Central venous access Age >= 75


History of VTE


Family history of VTE


Factor V Leiden


Prothrombin 52655O


Lupus anticoagulant


Anticardiolipin antibodies


Elevated serum homocysteine


Heparin-induced thrombocytopenia


Other congenital or acquired


   thrombophilia Stroke (< 1 month)


Elective arthroplasty


Hip, pelvis, or leg fracture


Acute spinal cord injury (< 1 month)








Prophylaxis Regimen











   Total Risk


Factor Score Risk Level Prophylaxis Regimen


 


0-1      Low Early ambulation


 


2 Moderate Order ONE of the following:


*Sequential Compression Device (SCD)


*Heparin 5000 units SQ BID


 


3-4 Higher Order ONE of the following medications:


*Heparin 5000 units SQ TID


*Enoxaparin/Lovenox 40 mg SQ daily (WT < 150 kg, CrCl > 30 mL/min)


*Enoxaparin/Lovenox 30 mg SQ daily (WT < 150 kg, CrCl > 10-29 mL/min)


*Enoxaparin/Lovenox 30 mg SQ BID (WT < 150 kg, CrCl > 30 mL/min)


AND/OR


*Sequential Compression Device (SCD)


 


5 or more Highest Order ONE of the following medications:


*Heparin 5000 units SQ TID (Preferred with Epidurals)


*Enoxaparin/Lovenox 40 mg SQ daily (WT < 150 kg, CrCl > 30 mL/min)


*Enoxaparin/Lovenox 30 mg SQ daily (WT < 150 kg, CrCl > 10-29 mL/min)


*Enoxaparin/Lovenox 30 mg SQ BID (WT < 150 kg, CrCl > 30 mL/min)


AND


*Sequential Compression Device (SCD)











Assessment and Plan


Assessment and Plan


1.  Lung mass: Status post bronchoscopy with multiple biopsies taken.  

Pathology pending.  Management per pulmonology.


2.  Atrial fibrillation with RVR: Patient's heart rate has been in the 120s.  

She was given 2.5 mg of IV metoprolol, which did not significantly improve her 

heart rate, but it did cause hypotension.  Cardiology consult requested.  

Diltiazem ordered.


3.  Dehydration: Patient appears clinically dry.  Given 500 mL bolus of normal 

saline.  Continue IV fluids.


4.  Diabetes mellitus: Hold metformin.  Monitor Accu-Cheks and cover with 

sliding scale insulin.


5.  History of PE: Patient has been on Coumadin, which was held preprocedure.  

Per pulmonology, he would like to keep her off of anticoagulation for at least 

24 hours due to the biopsies that were taken.


6.  Hypothyroidism: Continue [place] Thyroid.


7.  DVT prophylaxis: SCDs, IGNACIO hose.  Avoid chemical prophylaxis until cleared 

by pulmonology.











Keron Madera MD May 15, 2018 17:55

## 2018-05-15 NOTE — MB
cc:

Titi Loza MD, R Steven MD Doughney,Desmond Hankins MD, DO

****

 

 

DATE:

05/15/2018

 

REASON FOR CONSULTATION:

Tachycardia and atrial fibrillation, status post bronchoscopy.

 

HISTORY OF PRESENT ILLNESS:

Ms. Nugent is a 67-year-old white female with no past cardiac history 

who was in the outpatient unit today and is recently status post 

bronchoscopy and lung biopsy by Dr. Rodgers.  During that procedure it 

was seen the patient had some tachycardia consistent with rapid atrial

fibrillation and was treated with esmolol intravenously.  That was 

initially successful and then she was transferred to the 

postanesthesia unit where she remained in atrial fibrillation with 

rapid ventricular responses at times.  Her vital signs were otherwise 

stable and her blood pressure was good.  She did receive 2.5 mg of IV 

Lopressor prior to my arrival.  She is sitting up in bed, awake and 

drowsy, but able to answer questioning.  She denies any chest 

discomfort or shortness of breath at this time.  She tells me that she

has been occasionally having some palpitations recently.  She has also

noted some right-sided chest discomfort recently, which she felt was 

due to her underlying lung mass that she is undergoing workup for.  

She says she has never had any exertional chest discomfort or dyspnea.

 

PAST MEDICAL HISTORY:

Significant for melanoma of the left arm removed in .  She has 

been followed by Dr. Kaufman.  Recently, she was found to have a 5-7 

pound weight loss and multiple bilateral pulmonary nodules on a CT 

examination.  She has had longstanding hypertension and she has been 

treated for a prior DVT and pulmonary embolus.  Initially it was many 

years ago, around 2004, but she had a subsequent DVT in  and was 

placed back on warfarin anticoagulation indefinitely and has been on 

that up until a few days ago for this procedure.

 

OTHER PAST HISTORY:

Includes hypothyroidism, type 2 diabetes.  Denies history of 

myocardial infarction, stroke, thyroid, liver or kidney disease.

 

PAST SURGICAL HISTORY:

1.  Hernia repair.

2.  Recent bronchoscopy.

3.  Removal of the melanoma from her left arm.

 

MEDICATIONS:

1.  Recently on Cipro for a UTI.

2.  She is taking Lisinopril.

3.  Simvastatin.

4.  Prozac.

5.  Metformin.

6.  Warfarin.

7.  Thyroid.

 

FAMILY HISTORY:

Father  of pneumonia in his 80s.  Mother had a heart attack at age

77 One sister  of uncertain causes possible pulmonary embolus and 

another sister  at 77.

 

SOCIAL HISTORY:

The patient is  for 1 year.  She is a retired [employer] school 

manager.  Remote tobacco use.  No alcohol use.  Lives alone.  Does no 

regular exercise.

 

REVIEW OF SYSTEMS:

Recent 5-pound weight loss.  Denies lightheadedness or syncope.  

Denies exertional chest discomfort or dyspnea.  Denies fevers, chills,

night sweats, nausea, vomiting, diarrhea.  Denies any bleeding 

disorders.  Otherwise, except for that mentioned in the HPI, her 

complete 12-point review of systems is otherwise negative.

 

PHYSICAL EXAMINATION:

GENERAL:  Elderly, overweight white female sitting up in bed in the 

Postanesthesia Unit in no distress.  Has had mild nausea earlier.

VITAL SIGNS:  Blood pressure is 110/70, heart rate 124 and irregular, 

respiratory rate 18.  Temperature is 97.5, oxygen saturations 93% on 4

liters nasal cannula.

HEENT:  Head is normocephalic and atraumatic.  Pupils equal, round, 

reactive to light.  Sclerae are anicteric.  Extraocular movements 

intact.

NECK:  Supple.  There is no adenopathy.  There is no jugular venous 

distention at 90 degrees.  Carotid upstrokes are normal.  No bruits.  

Thyroid exam was normal.

LUNGS:  Clear.

HEART:  PMI is not displaced.  S1, S2 are rapid and irregular.  I hear

no murmurs, gallops, clicks or rubs at this time.

ABDOMEN:  Bowel sounds present.  Soft, nontender.  No 

hepatosplenomegaly, no masses or bruits.

EXTREMITIES:  Reveal no cyanosis, clubbing, or edema.  Perfusion is 

adequate in the upper and lower extremities.  There are no femoral 

bruits.

NEUROLOGIC:  Exam is nonfocal.

 

LABORATORY DATA:

There is an EKG available from today at 16:37 showing atrial flutter 

showing with a rapid ventricular response of 126 beats per minute.

 

An EKG available prior to that, from preadmission from 2018 did 

show sinus rhythm and was within normal limits.

 

LABORATORY DATA:

From May preop 2018:

CBC:  White count 7.1, hemoglobin 13.0, hematocrit 38.3, platelet 

count 332,000.

INR 1.1 from 2018.

From 2018, electrolytes are normal with a potassium of 4.3, BUN 

12, creatinine 0.67.  Glucose 91, calcium 9.1, magnesium 1.7, AST 34. 

 

TSH is 0.107.

 

IMPRESSION:

1.  Acute onset of atrial fibrillation with a rapid ventricular 

response, status post bronchoscopy.

2.  Hypertensive heart disease.

3.  Lung nodules undergoing workup.

4.  History of melanoma.

5.  Hypothyroidism on replacement.

6.  Hypercholesterolemia on statin therapy.

7.  Diabetes mellitus type 2.

 

RECOMMENDATIONS:

Currently intravenous diltiazem is not available on formulary due to a

national shortage.  She will be given intravenous Lopressor 5 mg 

boluses as needed for heart rate control.  She is currently still 

n.p.o. post-procedurally.  When she is able to take p.o., I will start

her on Cardizem initially with a 60 mg dose and then 60 mg q. 6 hours.

 We will hold off on rhythm management at this point and try and 

achieve rate control.  Perhaps she will convert on her own.  Labs 

including a CBC, basic metabolic profile and a magnesium level have 

been drawn STAT.  She is being placed on telemetry for observation 

overnight.

 

I discussed the case in detail with Dr. Michael Rodgers, the patient and 

nursing staff.

 

Thank you for allowing me to participate in the care of this patient. 

 

 

__________________________________

MD BERNY Valdivia/SB

D: 05/15/2018, 05:43 PM

T: 05/15/2018, 06:24 PM

Visit #: [id number]

Job #: [id number]

ADY

## 2018-05-15 NOTE — RADRPT
EXAM DATE/TIME:  05/15/2018 18:44 

 

[facility] COMPARISON:     

CHEST SINGLE AP, May 15, 2018, 16:16.

 

 

INDICATIONS :     

Mediastianal mass;biopsied today.

                      

 

RADIATION DOSE:     

9.59 CTDIvol (mGy) 

 

 

MEDICAL HISTORY :     

Hypertension. Carcinoma, not otherwise specified. Deep venous thrombosis. 

 

SURGICAL HISTORY :      

None.  

 

ENCOUNTER:      

Initial

 

ACUITY:      

1 day

 

PAIN SCALE:      

0/10

 

LOCATION:       

Bilateral chest 

 

TECHNIQUE:      

Volumetric scanning of the chest was performed.  Using automated exposure control and adjustment of t
he mA and/or kV according to patient size, radiation dose was kept as low as reasonably achievable to
 obtain optimal diagnostic quality images.  DICOM format image data is available electronically for r
eview and comparison.  

 

Follow-up recommendations for detected pulmonary nodules are based at a minimum on nodule size and pa
tient risk factors according to Fleischner Society Guidelines.

 

FINDINGS:     

There is a greater than 8 cm right hilar/infrahilar mass with contiguous involvement of the middle me
diastinum, extending into the subcarinal region. There are bilateral parenchymal lung nodules and mod
erate nodular infiltrate at the right lung base posteriorly. Mild nodular pleural thickening is prese
nt in the bases bilaterally.

 

There is a moderate sized hiatal hernia present.

 

In the upper abdomen, a fatty density left adrenal mass is incompletely seen. There is a low density 
lesion in the right lobe of the liver measuring about 17 mm in diameter

 

CONCLUSION:     

Right hilar/infrahilar mass with contiguous mediastinal involvement. Metastatic disease in the chest 
bilaterally and in the liver.

No evident complication of today's biopsy

 

 

 

 

 Gurmeet Curry MD on May 15, 2018 at 19:05           

Board Certified Radiologist.

 This report was verified electronically.

## 2018-05-16 VITALS — HEART RATE: 64 BPM

## 2018-05-16 VITALS
DIASTOLIC BLOOD PRESSURE: 58 MMHG | TEMPERATURE: 98.2 F | OXYGEN SATURATION: 91 % | SYSTOLIC BLOOD PRESSURE: 114 MMHG | HEART RATE: 69 BPM | RESPIRATION RATE: 18 BRPM

## 2018-05-16 VITALS — HEART RATE: 56 BPM

## 2018-05-16 VITALS — HEART RATE: 82 BPM

## 2018-05-16 VITALS
SYSTOLIC BLOOD PRESSURE: 96 MMHG | OXYGEN SATURATION: 94 % | TEMPERATURE: 98.1 F | DIASTOLIC BLOOD PRESSURE: 54 MMHG | HEART RATE: 67 BPM

## 2018-05-16 VITALS — HEART RATE: 80 BPM

## 2018-05-16 VITALS
OXYGEN SATURATION: 94 % | TEMPERATURE: 98.4 F | DIASTOLIC BLOOD PRESSURE: 54 MMHG | RESPIRATION RATE: 18 BRPM | HEART RATE: 77 BPM | SYSTOLIC BLOOD PRESSURE: 113 MMHG

## 2018-05-16 VITALS
OXYGEN SATURATION: 97 % | TEMPERATURE: 98 F | HEART RATE: 55 BPM | DIASTOLIC BLOOD PRESSURE: 51 MMHG | SYSTOLIC BLOOD PRESSURE: 96 MMHG

## 2018-05-16 VITALS
DIASTOLIC BLOOD PRESSURE: 56 MMHG | TEMPERATURE: 98.3 F | HEART RATE: 61 BPM | RESPIRATION RATE: 18 BRPM | SYSTOLIC BLOOD PRESSURE: 121 MMHG | OXYGEN SATURATION: 91 %

## 2018-05-16 VITALS — HEART RATE: 57 BPM

## 2018-05-16 VITALS — HEART RATE: 71 BPM

## 2018-05-16 VITALS — HEART RATE: 65 BPM

## 2018-05-16 VITALS — HEART RATE: 67 BPM

## 2018-05-16 VITALS — HEART RATE: 58 BPM

## 2018-05-16 VITALS — HEART RATE: 68 BPM

## 2018-05-16 VITALS — HEART RATE: 62 BPM

## 2018-05-16 VITALS — HEART RATE: 74 BPM

## 2018-05-16 VITALS — HEART RATE: 70 BPM

## 2018-05-16 LAB
BASOPHILS # BLD AUTO: 0 TH/MM3 (ref 0–0.2)
BASOPHILS NFR BLD: 0.5 % (ref 0–2)
BUN SERPL-MCNC: 12 MG/DL (ref 7–18)
CALCIUM SERPL-MCNC: 8.2 MG/DL (ref 8.5–10.1)
CHLORIDE SERPL-SCNC: 109 MEQ/L (ref 98–107)
CREAT SERPL-MCNC: 0.61 MG/DL (ref 0.5–1)
EOSINOPHIL # BLD: 0.1 TH/MM3 (ref 0–0.4)
EOSINOPHIL NFR BLD: 0.8 % (ref 0–4)
ERYTHROCYTE [DISTWIDTH] IN BLOOD BY AUTOMATED COUNT: 15.1 % (ref 11.6–17.2)
GFR SERPLBLD BASED ON 1.73 SQ M-ARVRAT: 97 ML/MIN (ref 89–?)
GLUCOSE SERPL-MCNC: 89 MG/DL (ref 74–106)
HCO3 BLD-SCNC: 25.4 MEQ/L (ref 21–32)
HCT VFR BLD CALC: 33.3 % (ref 35–46)
HGB BLD-MCNC: 11.2 GM/DL (ref 11.6–15.3)
INR PPP: 1 RATIO
LYMPHOCYTES # BLD AUTO: 1.4 TH/MM3 (ref 1–4.8)
LYMPHOCYTES NFR BLD AUTO: 17.7 % (ref 9–44)
MCH RBC QN AUTO: 29.2 PG (ref 27–34)
MCHC RBC AUTO-ENTMCNC: 33.6 % (ref 32–36)
MCV RBC AUTO: 87 FL (ref 80–100)
MONOCYTE #: 0.7 TH/MM3 (ref 0–0.9)
MONOCYTES NFR BLD: 8.5 % (ref 0–8)
NEUTROPHILS # BLD AUTO: 5.7 TH/MM3 (ref 1.8–7.7)
NEUTROPHILS NFR BLD AUTO: 72.5 % (ref 16–70)
PLATELET # BLD: 276 TH/MM3 (ref 150–450)
PMV BLD AUTO: 7.1 FL (ref 7–11)
PROTHROMBIN TIME: 10.4 SEC (ref 9.8–11.6)
RBC # BLD AUTO: 3.83 MIL/MM3 (ref 4–5.3)
SODIUM SERPL-SCNC: 142 MEQ/L (ref 136–145)
WBC # BLD AUTO: 7.9 TH/MM3 (ref 4–11)

## 2018-05-16 RX ADMIN — INSULIN ASPART SCH: 100 INJECTION, SOLUTION INTRAVENOUS; SUBCUTANEOUS at 08:00

## 2018-05-16 RX ADMIN — INSULIN ASPART SCH: 100 INJECTION, SOLUTION INTRAVENOUS; SUBCUTANEOUS at 12:00

## 2018-05-16 RX ADMIN — STANDARDIZED SENNA CONCENTRATE AND DOCUSATE SODIUM SCH TAB: 8.6; 5 TABLET, FILM COATED ORAL at 08:18

## 2018-05-16 NOTE — EKG
Date Performed: 05/15/2018       Time Performed: 16:37:23

 

PTAGE:      67 years

 

EKG:      ATRIAL FIBRILLATION WITH RAPID VENTRICULAR RESPONSE ABNORMAL RHYTHM ECG

 

PREVIOUS TRACING       : 05/04/2018 10.53 Compared to previous tracing, atrial fibrillation is new

 

DOCTOR:   Manny Navarro  Interpretating Date/Time  05/16/2018 22:30:12

## 2018-05-16 NOTE — EKG
Date Performed: 05/16/2018       Time Performed: 06:20:36

 

PTAGE:      67 years

 

EKG:      Sinus rhythm 

 

 Poor R wave progression - probable normal variant Low QRS voltages in precordial leads Borderline EC
G

 

PREVIOUS TRACING       : 05/15/2018 16.37 Compared to previous tracing,  Afib with RVR is no longer p
resent

 

DOCTOR:   Manny Navarro  Interpretating Date/Time  05/16/2018 22:17:44

## 2018-05-16 NOTE — HHI.PR
Subjective


Remarks


Resting comfortably in bed


No event overnight


Denied chest and or short of breath


No fever or chills





Discussed with the patient and her family and with Dr. Rodgers the pulmonologist 

he recommended placing her on Coumadin 4 mg only and he will see her in few 

days for follow-up





Objective


Vitals





Vital Signs








  Date Time  Temp Pulse Resp B/P (MAP) Pulse Ox O2 Delivery O2 Flow Rate FiO2


 


5/16/18 16:00  74      


 


5/16/18 15:45 98.2 69 18 114/58 (76) 91   


 


5/16/18 15:01  65      


 


5/16/18 14:00  68      


 


5/16/18 13:01  80      


 


5/16/18 12:01  80      


 


5/16/18 11:15 98.4 77 18 113/54 (73) 94   


 


5/16/18 11:00  82      


 


5/16/18 10:00  64      


 


5/16/18 09:00  64      


 


5/16/18 08:01 98.3 61 18 121/56 (77) 91   


 


5/16/18 08:01     91 Room Air  


 


5/16/18 08:00  62      


 


5/16/18 07:00  71      


 


5/16/18 06:05  57      


 


5/16/18 05:05  67      


 


5/16/18 04:32 98.1 67  96/54 (68) 94   


 


5/16/18 04:28  70      


 


5/16/18 03:02  56      


 


5/16/18 02:10  58      


 


5/16/18 01:01  56      


 


5/16/18 00:13 98.0 55  96/51 (66) 97   


 


5/16/18 00:00  64      


 


5/15/18 23:00  52      


 


5/15/18 22:00  56      


 


5/15/18 21:20      Nasal Cannula 2.00 


 


5/15/18 21:00  64      


 


5/15/18 20:00  62      


 


5/15/18 19:00     97 Nasal Cannula 2.00 


 


5/15/18 19:00  68 16 100/54 (69) 95 Nasal Cannula 3 


 


5/15/18 19:00 98.0 65  103/53 (70) 97   


 


5/15/18 18:45  69 16 101/52 (68) 95 Nasal Cannula 3 


 


5/15/18 18:30 97.6 70 16 91/53 (66) 95 Nasal Cannula 3 














I/O      


 


 5/15/18 5/15/18 5/15/18 5/16/18 5/16/18 5/16/18





 07:00 15:00 23:00 07:00 15:00 23:00


 


Intake Total   600 ml 758 ml  720 ml


 


Output Total    600 ml  300 ml


 


Balance   600 ml 158 ml  420 ml


 


      


 


Intake Oral   100 ml 240 ml  720 ml


 


IV Total   500 ml 518 ml  


 


Output Urine Total    600 ml  300 ml


 


# Voids   0   3


 


# Bowel Movements      0








Result Diagram:  


5/16/18 0440 5/16/18 0440





Objective Remarks


GENERAL: This is a well-nourished, well-developed patient, in no apparent 

distress.


SKIN: No rashes, warm and dry 


HEAD: Atraumatic. Normocephalic. 


EYES: Pupils equal round and reactive. Extraocular motions intact. No scleral 

icterus. 


ENT: Nose without bleeding, or drainage, Airway patent.


NECK: Trachea midline.  Supple


CARDIOVASCULAR: Regular rate and rhythm without murmurs, gallops, or rubs. 


RESPIRATORY: Fair air entry bilaterally. No wheezes, rales, or rhonchi.  


GASTROINTESTINAL: Abdomen soft, non-tender, nondistended.  Positive bowel sounds


MUSCULOSKELETAL: Extremities without clubbing, cyanosis, or edema.  Pedal 

pulses appreciated


NEUROLOGICAL: Awake and alert.  Moves all extremity.  Normal speech.no focal 

neurological deficit





A/P


Problem List:  


(1) Atrial fibrillation with RVR


ICD Code:  I48.91 - Unspecified atrial fibrillation


(2) Diabetes mellitus


ICD Code:  E11.9 - Type 2 diabetes mellitus without complications


(3) Hypothyroidism


ICD Code:  E03.9 - Hypothyroidism, unspecified


(4) History of pulmonary embolus (PE)


ICD Code:  Z86.711 - Personal history of pulmonary embolism


Assessment and Plan


1.  Lung mass: Status post bronchoscopy with multiple biopsies taken.  

Pathology pending.  Management per pulmonology.


2.  Atrial fibrillation with RVR precipitated by procedure bronchoscopy: Needed 

osmole I will drip and Cardizem cardiology consultation


3.  Dehydration: Patient appears clinically dry.  Given 500 mL bolus of normal 

saline.  Continue IV fluids.


4.  Diabetes mellitus: Hold metformin.  Monitor Accu-Cheks and cover with 

sliding scale insulin.


5.  History of PE: Patient has been on Coumadin, which was held preprocedure.  

Per pulmonology, to stay off of anticoagulation for at least 24 hours due to 

the biopsies that were taken back to continue on only Coumadin 4 mg daily to be 

followed by her PCP 


6.  Hypothyroidism: Continue [place] Thyroid.


7.  DVT prophylaxis: SCDs, IGNACIO hose.  Avoid chemical prophylaxis until cleared 

by pulmonology.


8.  Morbid obesity BMI 41.4








5/16:Discussed with the patient and her family and with Dr. Rodgers the 

pulmonologist he recommended placing her on Coumadin 4 mg only and he will see 

her in few days for follow-up


Discharge Planning


Discharge patient to home


Condition on discharge: Improved


Healthy heart diet as tolerated


Ad Soheila activity


Rx written: See med rec


Follow-up with primary care physician, cardiology in 1 week, pulmonology as 

directed











Janis Sifuentes MD May 16, 2018 18:27

## 2018-05-16 NOTE — PD.CARD.PN
Subjective


Subjective Remarks


Had a good night.  Denies CP or SOB. Converted to NSR overnight.





Objective


Medications





Current Medications








 Medications


  (Trade)  Dose


 Ordered  Sig/Yi


 Route  Start Time


 Stop Time Status Last Admin


 


 Sodium Chloride  500 ml @ 


 30 mls/hr  [id number] PRN


 IV  5/15/18 11:30


 5/18/18 11:29   


 


 


  (Lopressor)  25 mg  ON CALL  PRN


 PO  5/15/18 11:30


 5/18/18 11:29   


 


 


  (Betadine 5%


 Antisepsis Kit)  1 applic  ON CALL  PRN


 EACH NARE  5/15/18 11:30


 5/18/18 11:29   


 


 


  (Chlorhexidine


 2% Cloth)  3 pack  ON CALL  PRN


 TOPICAL  5/15/18 11:30


 5/18/18 11:29   


 


 


  (NovoLIN R INJ)  See


 Protocol


 Table ...  ON CALL  PRN


 SQ  5/15/18 11:30


 5/18/18 11:29   


 


 


  (Atrovent Neb)  0.5 mg  UNSCH X1  PRN


 NEB  5/15/18 16:00


 5/16/18 15:59   


 


 


  (Atrovent Neb)  0.5 mg  UNSCH X1  PRN


 NEB  5/15/18 16:45


 5/16/18 16:44   


 


 


  (Misc Nursing


 Information)  ALL


 NURSING


 DEPARTME...  UNSCH  PRN


 .XX  5/15/18 17:00


 5/16/18 16:59   


 


 


  (Lopressor Inj)  5 mg  Q1H  PRN


 IV PUSH  5/15/18 17:30


     


 


 


  (Tylenol)  650 mg  Q4H  PRN


 PO  5/15/18 17:45


     


 


 


  (Zofran Inj)  4 mg  Q6H  PRN


 IVP  5/15/18 17:45


     


 


 


  (Narcan Inj)  0.4 mg  UNSCH  PRN


 IV PUSH  5/15/18 17:45


     


 


 


  (Velma-Colace)  1 tab  BID


 PO  5/15/18 21:00


    5/15/18 20:59


 


 


  (Milk Of


 Magnesia Liq)  30 ml  Q12H  PRN


 PO  5/15/18 17:45


     


 


 


  (Senokot)  17.2 mg  Q12H  PRN


 PO  5/15/18 17:45


     


 


 


  (Dulcolax Supp)  10 mg  DAILY  PRN


 RECTAL  5/15/18 17:45


     


 


 


  (Lactulose Liq)  30 ml  DAILY  PRN


 PO  5/15/18 17:45


     


 


 


  (D50w (Vial) Inj)  50 ml  UNSCH  PRN


 IV PUSH  5/15/18 18:00


     


 


 


  (Glucagon Inj)  1 mg  UNSCH  PRN


 OTHER  5/15/18 18:00


     


 


 


  (NovoLOG


 SUPPLEMENTAL


 SCALE)  1  ACHS SLIDING  SCALE


 SQ  5/15/18 21:00


     


 


 


  (PROzac)  20 mg  DAILY


 PO  5/16/18 09:00


     


 


 


  (Prinivil)  5 mg  DAILY


 PO  5/16/18 09:00


     


 


 


  ([place] Thyroid)  30 mg  DAILY


 PO  5/16/18 09:00


     


 


 


  (Pravachol)  20 mg  DAILY


 PO  5/16/18 09:00


     


 


 


  (Cardizem)  30 mg  ONCE  ONCE


 PO  5/16/18 06:45


 5/16/18 06:46 UNV  


 








Vital Signs / I&O





Vital Signs








  Date Time  Temp Pulse Resp B/P (MAP) Pulse Ox O2 Delivery O2 Flow Rate FiO2


 


5/16/18 06:05  57      


 


5/16/18 05:05  67      


 


5/16/18 04:32 98.1 67  96/54 (68) 94   


 


5/16/18 04:28  70      


 


5/16/18 03:02  56      


 


5/16/18 02:10  58      


 


5/16/18 01:01  56      


 


5/16/18 00:13 98.0 55  96/51 (66) 97   


 


5/16/18 00:00  64      


 


5/15/18 23:00  52      


 


5/15/18 22:00  56      


 


5/15/18 21:20      Nasal Cannula 2.00 


 


5/15/18 21:00  64      


 


5/15/18 20:00  62      


 


5/15/18 19:00     97 Nasal Cannula 2.00 


 


5/15/18 19:00  68 16 100/54 (69) 95 Nasal Cannula 3 


 


5/15/18 19:00 98.0 65  103/53 (70) 97   


 


5/15/18 18:45  69 16 101/52 (68) 95 Nasal Cannula 3 


 


5/15/18 18:30 97.6 70 16 91/53 (66) 95 Nasal Cannula 3 


 


5/15/18 18:15  98 16 91/63 (72) 94 Nasal Cannula 3 


 


5/15/18 18:00  110 16 90/62 (71) 93 Nasal Cannula 3 


 


5/15/18 17:45  116 16 90/64 (73) 93 Nasal Cannula 3 


 


5/15/18 17:30  115 16 96/62 (73) 92 Nasal Cannula 3 


 


5/15/18 17:15  121 16 110/55 (73) 92 Nasal Cannula 3 


 


5/15/18 17:00  124 17 95/59 (71) 95 Nasal Cannula 4 


 


5/15/18 16:45  119 17 128/63 (84) 95 Nasal Cannula 4 


 


5/15/18 16:30  120 17 139/78 (98) 94 Nasal Cannula 4 


 


5/15/18 16:15  130 17 155/75 (101) 93 Nasal Cannula 4 


 


5/15/18 16:10 97.5 133 24 157/82 (107) 93 Nasal Cannula 4 


 


5/15/18 15:45  98      


 


5/15/18 11:26      Room Air  














I/O      


 


 5/15/18 5/15/18 5/15/18 5/16/18 5/16/18 5/16/18





 07:00 15:00 23:00 07:00 15:00 23:00


 


Intake Total   600 ml 758 ml  


 


Output Total    600 ml  


 


Balance   600 ml 158 ml  


 


      


 


Intake Oral   100 ml 240 ml  


 


IV Total   500 ml 518 ml  


 


Output Urine Total    600 ml  


 


# Voids   0   








Physical Exam


VSS, afebrile.


No JVD


Lungs: CTA


Heart; RRR. no murmur, gal, rubs.


Ext: No C/C/E, warm and well perfused.


Neuro: Non-focal.


Laboratory


EKG: NSR, rate 62, poor R wave progression. QTc 425 ms.








Laboratory Tests








Test


  5/15/18


20:42 5/15/18


20:49 5/16/18


04:40


 


White Blood Count 12.8 TH/MM3   7.9 TH/MM3 


 


Red Blood Count 4.02 MIL/MM3   3.83 MIL/MM3 


 


Hemoglobin 11.6 GM/DL   11.2 GM/DL 


 


Hematocrit 35.4 %   33.3 % 


 


Mean Corpuscular Volume 88.0 FL   87.0 FL 


 


Mean Corpuscular Hemoglobin 28.9 PG   29.2 PG 


 


Mean Corpuscular Hemoglobin


Concent 32.9 % 


  


  33.6 % 


 


 


Red Cell Distribution Width 15.4 %   15.1 % 


 


Platelet Count 244 TH/MM3   276 TH/MM3 


 


Mean Platelet Volume 7.3 FL   7.1 FL 


 


Blood Urea Nitrogen  14 MG/DL  12 MG/DL 


 


Creatinine  0.65 MG/DL  0.61 MG/DL 


 


Random Glucose  111 MG/DL  89 MG/DL 


 


Calcium Level  8.0 MG/DL  8.2 MG/DL 


 


Sodium Level  143 MEQ/L  142 MEQ/L 


 


Potassium Level  4.5 MEQ/L  4.3 MEQ/L 


 


Chloride Level  110 MEQ/L  109 MEQ/L 


 


Carbon Dioxide Level  22.8 MEQ/L  25.4 MEQ/L 


 


Anion Gap  10 MEQ/L  8 MEQ/L 


 


Estimat Glomerular Filtration


Rate 


  90 ML/MIN 


  97 ML/MIN 


 


 


Magnesium Level  2.0 MG/DL  


 


Neutrophils (%) (Auto)   72.5 % 


 


Lymphocytes (%) (Auto)   17.7 % 


 


Monocytes (%) (Auto)   8.5 % 


 


Eosinophils (%) (Auto)   0.8 % 


 


Basophils (%) (Auto)   0.5 % 


 


Neutrophils # (Auto)   5.7 TH/MM3 


 


Lymphocytes # (Auto)   1.4 TH/MM3 


 


Monocytes # (Auto)   0.7 TH/MM3 


 


Eosinophils # (Auto)   0.1 TH/MM3 


 


Basophils # (Auto)   0.0 TH/MM3 


 


CBC Comment   DIFF FINAL 


 


Differential Comment    








Imaging





Last 48 hours Impressions








Chest X-Ray 5/15/18 0000 Signed





Impressions: 





 Service Date/Time:  Tuesday, May 15, 2018 16:16 - CONCLUSION:  1. No 

significant 





 pneumothorax status post bronchoscopy. 2. Approximately 6 cm right perihilar 





 lung mass.     Jean Ordoñez MD 


 


Chest CT 5/15/18 0000 Signed





Impressions: 





 Service Date/Time:  Tuesday, May 15, 2018 18:44 - CONCLUSION:  Right 





 hilar/infrahilar mass with contiguous mediastinal involvement. Metastatic 





 disease in the chest bilaterally and in the liver. No evident complication of 





 today's biopsy      Gurmeet Curry MD 











Assessment and Plan


Problem List:  


(1) Paroxysmal atrial fibrillation with rapid ventricular response


ICD Codes:  I48.0 - Paroxysmal atrial fibrillation


Status:  Acute


(2) Hypertension


ICD Codes:  I10 - Essential (primary) hypertension


(3) Hypercholesteremia


ICD Codes:  E78.00 - Pure hypercholesterolemia, unspecified


(4) Hypothyroid


ICD Codes:  E03.9 - Hypothyroidism, unspecified


(5) Lung mass


ICD Codes:  R91.8 - Other nonspecific abnormal finding of lung field


(6) Hypothyroidism


ICD Codes:  E03.9 - Hypothyroidism, unspecified


(7) Diabetes mellitus


ICD Codes:  E11.9 - Type 2 diabetes mellitus without complications


Assessment and Plan


Back in NSR this AM.  BP and HR low side. Will D/C lisinopril.


Change cardizem to Cardizem  mg PO daily.


Resume warfarin if OK with Dr. Rodgers.


CV stable and OK to discharge home today.


Will arrange F/U next week with further outpatient workup.


Code Status


Full


Discussed Condition With


Patient, RN staff and Dr. Rodgers.











Titi Loza MD May 16, 2018 06:48

## 2018-05-16 NOTE — MB
cc:

STEPHEN Rodgers MD,Desmond Hankins MD, DO

****

 

 

DATE:

05/16/2018

 

HISTORY:

Ms. Nugent is a 67-year-old white female with a history of melanoma on 

her left arm in 2015.  She has been followed carefully by Dr. Kaufman.  Recently she was found to have a mass in the right hilum of

her lung after she complained of generalized fatigue, loss of appetite

and 5-pound weight loss.  We brought her in yesterday as an outpatient

for a diagnostic bronchoscopy.

 

At the end of the procedure, the patient developed rapid atrial 

arrhythmia and then atrial fibrillation.  Dr. Loza saw her, we 

admitted her overnight to the hospitalist service for monitoring and 

rate control.

 

She had had no prior cardiovascular history.

 

At the time of this dictation, the patient is awake, alert, 

comfortable, back in sinus rhythm after receiving diltiazem.  Dr. Loza saw her this morning and agreed that discharge was 

appropriate.

 

She has really had no respiratory complications from the procedure.  

We did do a CT scan last night.  In light of the change in status and 

she has an 8 cm right infrahilar mass with extension into the 

mediastinum or it may arise in the mediastinal lymph nodes, but no 

obvious post-procedure complication such as pneumothorax or obvious 

bleeding.  This is a bit larger than the last scan, which I saw from 

April.

 

Pathology reports are all pending.

 

PAST MEDICAL HISTORY:

Reviewed in my consultation note.

 

PHYSICAL EXAMINATION:

VITAL SIGNS:  98, 113/50, pulse 94, respirations 18, nonlabored.  O2 

saturation on room air 94%.

NECK:  No adenopathy in the neck.

LUNGS:  No subcutaneous air.  Breath sounds are clear.

HEART:  Regular rhythm.

EXTREMITIES:  No significant edema.

 

LABORATORY DATA:

Hemoglobin stable at 11.2, white count 7900.

Electrolytes were normal.

 

DISCUSSION:

Ms. Nugent was admitted to observation overnight for onset of atrial 

fibrillation post-bronchoscopy.  She has now converted.  Dr. Loza has seen her and she is cleared for discharge.  He has a 

followup with her next week.

 

Pulmonary status is stable.  Room air saturations are good.

 

I explained to she and her son that the pathology reports are pending.

 Hopefully, they will be out within 48-72 hours and I will be sure Dr. Kaufman gets those as soon as they are available.

 

She will also resume her Coumadin at 4 mg tomorrow, which was her 

usual dose, for a history of recurrent DVT.

 

She will call Dr. Mcintyre's office tomorrow and also arrange her 

regular INR followups there.

 

I also spoke with the hospitalist this morning and reviewed this plan.

 They are arranging discharge for today.  Further intervention will 

depend on the results of these biopsies.

 

 

__________________________________

R. MD HATTIE Queen/NAVNEET

D: 05/16/2018, 12:49 PM

T: 05/16/2018, 02:02 PM

Visit #: [id number]

Job #: [id number]

## 2018-05-17 NOTE — HHI.DS
__________________________________________________





Discharge Summary


Admission Date


May 15, 2018 at 19:37


Discharge Date:  May 16, 2018


Admitting Diagnosis


Atrial fibrillation with RVR





(1) Atrial fibrillation with RVR


ICD Code:  I48.91 - Unspecified atrial fibrillation


(2) Diabetes mellitus


ICD Code:  E11.9 - Type 2 diabetes mellitus without complications


(3) Hypothyroidism


ICD Code:  E03.9 - Hypothyroidism, unspecified


(4) History of pulmonary embolus (PE)


ICD Code:  Z86.711 - Personal history of pulmonary embolism


Procedures


Bronchoscopy


Brief History - From Admission


The patient is a 67-year-old female who was brought to the hospital for 

bronchoscopy, done by Dr. Rodgers today.  Patient developed atrial fibrillation 

with RVR during the procedure.  She was given esmolol and return to sinus 

rhythm.  She again went back to [facility] with RVR.  Hospitalist was requested to 

admit the patient.  Dr. Loza, cardiology, was also contacted by the 

pulmonologist.  The patient is seen in PACU.  She denies chest pain.  Does have 

some shortness of breath, which she states has been going on for the past week 

or so.  She reports nausea, but no vomiting.


CBC/BMP:  


5/16/18 0440                                                                   

             5/16/18 0440





Significant Findings





Laboratory Tests








Test


  5/15/18


20:42 5/15/18


20:49 5/16/18


04:40 5/16/18


13:20


 


White Blood Count


  12.8 TH/MM3


(4.0-11.0) 


  


  


 


 


Random Glucose


  


  111 MG/DL


() 


  


 


 


Calcium Level


  


  8.0 MG/DL


(8.5-10.1) 8.2 MG/DL


(8.5-10.1) 


 


 


Chloride Level


  


  110 MEQ/L


() 109 MEQ/L


() 


 


 


Red Blood Count


  


  


  3.83 MIL/MM3


(4.00-5.30) 


 


 


Hemoglobin


  


  


  11.2 GM/DL


(11.6-15.3) 


 


 


Hematocrit


  


  


  33.3 %


(35.0-46.0) 


 


 


Neutrophils (%) (Auto)


  


  


  72.5 %


(16.0-70.0) 


 


 


Monocytes (%) (Auto)


  


  


  8.5 %


(0.0-8.0) 


 








PE at Discharge


GENERAL: This is a well-nourished, well-developed patient, in no apparent 

distress.


SKIN: No rashes, warm and dry 


HEAD: Atraumatic. Normocephalic. 


EYES: Pupils equal round and reactive. Extraocular motions intact. No scleral 

icterus. 


ENT: Nose without bleeding, or drainage, Airway patent.


NECK: Trachea midline.  Supple


CARDIOVASCULAR: Regular rate and rhythm without murmurs, gallops, or rubs. 


RESPIRATORY: Fair air entry bilaterally. No wheezes, rales, or rhonchi.  


GASTROINTESTINAL: Abdomen soft, non-tender, nondistended.  Positive bowel sounds


MUSCULOSKELETAL: Extremities without clubbing, cyanosis, or edema.  Pedal 

pulses appreciated


NEUROLOGICAL: Awake and alert.  Moves all extremity.  Normal speech.no focal 

neurological deficit


Hospital Course


67 years old female with H/O lung mass admitted to have bronchoscopy by her 

pulmonologist Dr. Rodgers, patient had bronchoscopy with multiple biopsies taken.

  Pathology pending.  Management per pulmonology.


Atrial fibrillation with RVR precipitated by procedure bronchoscopy: Needed 

osmole I will drip and Cardizem cardiology consultation


Dehydration: Patient appears clinically dry.  Given 500 mL bolus of normal 

saline.  Continue IV fluids.


Diabetes mellitus: Hold metformin.  Monitor Accu-Cheks and cover with sliding 

scale insulin.


 History of PE: Patient has been on Coumadin, which was held preprocedure.  Per 

pulmonology, to stay off of anticoagulation for at least 24 hours due to the 

biopsies that were taken back to continue on only Coumadin 4 mg daily to be 

followed by her PCP ered


Hypothyroidism: Continue [place] Thyroid.


DVT prophylaxis: SCDs, IGNACIO hose.  Avoid chemical prophylaxis due to recent 

bronchoscopy, also patient had morbid obesity BMI 41.4








On 5/16:Discussed with the patient and her family and with Dr. Rodgers the 

pulmonologist he recommended placing her on Coumadin 4 mg only and he will see 

her in few days for follow-up





Face-to-face encounter performed with the patient on discharge day, as well as 

physical exam, summary of hospitalization course and postdischarge plan has 

been  


D/W the patient.


D/W nurse 


D/W .


Discharge medications reviewed and printed and signed, post discharge follow up 

visit with PCP and other specialist as well as Brief hospital course and 

discharge summary has been placed.


Pt Condition on Discharge:  Good


Discharge Disposition:  Discharge Home


Discharge Time:  > 30 minutes


Discharge Instructions


DIET: Follow Instructions for:  Heart Healthy Diet, Diabetic Diet


Activities you can perform:  Weight Bearing as Issac


Follow up Referrals:  


Pulmonology - 2-3 Days with STEPHEN Rodgers MD





New Orders:  


PT/INR - Daily





New Medications:  


Warfarin (Coumadin) 4 Mg Tab


4 MG PO DAILY for Prevent Blood Clot, #30 TAB 0 Refills





Diltiazem CD 24 HR (Cardizem CD 24 HR) 180 Mg Caper


180 MG PO DAILY for cardiac, #30 CAP





 


Continued Medications:  


Fluoxetine (Fluoxetine) 20 Mg Tab


20 MG PO DAILY, #30 TAB 0 Refills





Metformin (Metformin) 1,000 Mg Tab


1000 MG PO DAILY for Blood Sugar Management, #30 TAB 0 Refills


With a meal


Thyroid (Np Thyroid) 30 Mg Tab


30 MG PO DAILY for Thyroid Supplement, #30 TAB 0 Refills

















Janis Sifuentes MD May 17, 2018 15:22

## 2018-06-03 ENCOUNTER — HOSPITAL ENCOUNTER (OUTPATIENT)
Dept: HOSPITAL 17 - PHED | Age: 68
Setting detail: OBSERVATION
LOS: 2 days | Discharge: HOME | End: 2018-06-05
Attending: FAMILY MEDICINE | Admitting: FAMILY MEDICINE
Payer: MEDICARE

## 2018-06-03 VITALS
OXYGEN SATURATION: 94 % | HEART RATE: 84 BPM | RESPIRATION RATE: 18 BRPM | DIASTOLIC BLOOD PRESSURE: 65 MMHG | TEMPERATURE: 98 F | SYSTOLIC BLOOD PRESSURE: 128 MMHG

## 2018-06-03 VITALS
SYSTOLIC BLOOD PRESSURE: 145 MMHG | OXYGEN SATURATION: 93 % | TEMPERATURE: 98.3 F | DIASTOLIC BLOOD PRESSURE: 67 MMHG | RESPIRATION RATE: 18 BRPM | HEART RATE: 90 BPM

## 2018-06-03 VITALS
OXYGEN SATURATION: 95 % | DIASTOLIC BLOOD PRESSURE: 77 MMHG | RESPIRATION RATE: 18 BRPM | HEART RATE: 81 BPM | SYSTOLIC BLOOD PRESSURE: 148 MMHG

## 2018-06-03 VITALS — WEIGHT: 231.93 LBS | HEIGHT: 63 IN | BODY MASS INDEX: 41.09 KG/M2

## 2018-06-03 VITALS
HEART RATE: 73 BPM | RESPIRATION RATE: 18 BRPM | DIASTOLIC BLOOD PRESSURE: 65 MMHG | OXYGEN SATURATION: 94 % | SYSTOLIC BLOOD PRESSURE: 140 MMHG

## 2018-06-03 VITALS
DIASTOLIC BLOOD PRESSURE: 73 MMHG | RESPIRATION RATE: 20 BRPM | OXYGEN SATURATION: 93 % | HEART RATE: 91 BPM | SYSTOLIC BLOOD PRESSURE: 115 MMHG

## 2018-06-03 VITALS
HEART RATE: 79 BPM | DIASTOLIC BLOOD PRESSURE: 68 MMHG | RESPIRATION RATE: 20 BRPM | OXYGEN SATURATION: 96 % | SYSTOLIC BLOOD PRESSURE: 124 MMHG

## 2018-06-03 VITALS
RESPIRATION RATE: 18 BRPM | OXYGEN SATURATION: 95 % | SYSTOLIC BLOOD PRESSURE: 135 MMHG | DIASTOLIC BLOOD PRESSURE: 74 MMHG | HEART RATE: 81 BPM

## 2018-06-03 VITALS — OXYGEN SATURATION: 100 %

## 2018-06-03 VITALS — SYSTOLIC BLOOD PRESSURE: 124 MMHG | DIASTOLIC BLOOD PRESSURE: 68 MMHG

## 2018-06-03 DIAGNOSIS — D72.828: ICD-10-CM

## 2018-06-03 DIAGNOSIS — K29.70: ICD-10-CM

## 2018-06-03 DIAGNOSIS — Z96.642: ICD-10-CM

## 2018-06-03 DIAGNOSIS — C34.90: ICD-10-CM

## 2018-06-03 DIAGNOSIS — Z87.891: ICD-10-CM

## 2018-06-03 DIAGNOSIS — E03.9: ICD-10-CM

## 2018-06-03 DIAGNOSIS — K20.9: ICD-10-CM

## 2018-06-03 DIAGNOSIS — Z85.820: ICD-10-CM

## 2018-06-03 DIAGNOSIS — E11.9: ICD-10-CM

## 2018-06-03 DIAGNOSIS — Z79.899: ICD-10-CM

## 2018-06-03 DIAGNOSIS — Z79.84: ICD-10-CM

## 2018-06-03 DIAGNOSIS — K44.9: ICD-10-CM

## 2018-06-03 DIAGNOSIS — C78.00: ICD-10-CM

## 2018-06-03 DIAGNOSIS — C78.7: ICD-10-CM

## 2018-06-03 DIAGNOSIS — D64.9: ICD-10-CM

## 2018-06-03 DIAGNOSIS — I48.0: ICD-10-CM

## 2018-06-03 DIAGNOSIS — Z86.711: ICD-10-CM

## 2018-06-03 DIAGNOSIS — K29.80: ICD-10-CM

## 2018-06-03 DIAGNOSIS — R10.31: Primary | ICD-10-CM

## 2018-06-03 DIAGNOSIS — I10: ICD-10-CM

## 2018-06-03 DIAGNOSIS — F32.9: ICD-10-CM

## 2018-06-03 DIAGNOSIS — R91.8: ICD-10-CM

## 2018-06-03 DIAGNOSIS — R42: ICD-10-CM

## 2018-06-03 DIAGNOSIS — Z85.42: ICD-10-CM

## 2018-06-03 DIAGNOSIS — Z79.01: ICD-10-CM

## 2018-06-03 DIAGNOSIS — M19.90: ICD-10-CM

## 2018-06-03 DIAGNOSIS — Z90.710: ICD-10-CM

## 2018-06-03 LAB
ALBUMIN SERPL-MCNC: 2.8 GM/DL (ref 3.4–5)
ALP SERPL-CCNC: 86 U/L (ref 45–117)
ALT SERPL-CCNC: 16 U/L (ref 10–53)
AMORPHOUS SEDIMENT, URINE: (no result)
AST SERPL-CCNC: 13 U/L (ref 15–37)
BASOPHILS # BLD AUTO: 0 TH/MM3 (ref 0–0.2)
BASOPHILS NFR BLD: 0.2 % (ref 0–2)
BILIRUB SERPL-MCNC: 0.5 MG/DL (ref 0.2–1)
BUN SERPL-MCNC: 11 MG/DL (ref 7–18)
CALCIUM SERPL-MCNC: 8.6 MG/DL (ref 8.5–10.1)
CHLORIDE SERPL-SCNC: 102 MEQ/L (ref 98–107)
COLOR UR: YELLOW
CREAT SERPL-MCNC: 0.53 MG/DL (ref 0.5–1)
EOSINOPHIL # BLD: 0.1 TH/MM3 (ref 0–0.4)
EOSINOPHIL NFR BLD: 0.9 % (ref 0–4)
ERYTHROCYTE [DISTWIDTH] IN BLOOD BY AUTOMATED COUNT: 13.4 % (ref 11.6–17.2)
GFR SERPLBLD BASED ON 1.73 SQ M-ARVRAT: 114 ML/MIN (ref 89–?)
GLUCOSE SERPL-MCNC: 91 MG/DL (ref 74–106)
GLUCOSE UR STRIP-MCNC: (no result) MG/DL
HCO3 BLD-SCNC: 27.8 MEQ/L (ref 21–32)
HCT VFR BLD CALC: 30.7 % (ref 35–46)
HGB BLD-MCNC: 10.4 GM/DL (ref 11.6–15.3)
HGB UR QL STRIP: (no result)
INR PPP: 2 RATIO
KETONES UR STRIP-MCNC: (no result) MG/DL
LEUKOCYTE ESTERASE UR QL STRIP: (no result) /HPF (ref 0–5)
LYMPHOCYTES # BLD AUTO: 1.7 TH/MM3 (ref 1–4.8)
LYMPHOCYTES NFR BLD AUTO: 14.7 % (ref 9–44)
MCH RBC QN AUTO: 28.7 PG (ref 27–34)
MCHC RBC AUTO-ENTMCNC: 33.9 % (ref 32–36)
MCV RBC AUTO: 84.6 FL (ref 80–100)
MONOCYTE #: 0.9 TH/MM3 (ref 0–0.9)
MONOCYTES NFR BLD: 8 % (ref 0–8)
NEUTROPHILS # BLD AUTO: 9.1 TH/MM3 (ref 1.8–7.7)
NEUTROPHILS NFR BLD AUTO: 76.2 % (ref 16–70)
NITRITE UR QL STRIP: (no result)
PLATELET # BLD: 505 TH/MM3 (ref 150–450)
PMV BLD AUTO: 6.9 FL (ref 7–11)
PROT SERPL-MCNC: 7 GM/DL (ref 6.4–8.2)
PROTHROMBIN TIME: 20.7 SEC (ref 9.8–11.6)
RBC # BLD AUTO: 3.63 MIL/MM3 (ref 4–5.3)
SODIUM SERPL-SCNC: 137 MEQ/L (ref 136–145)
SP GR UR STRIP: 1.01 (ref 1–1.03)
SQUAMOUS #/AREA URNS HPF: (no result) /HPF (ref 0–5)
TRANS CELLS #/AREA URNS HPF: (no result) /HPF
URINE LEUKOCYTE ESTERASE: (no result)
WBC # BLD AUTO: 11.8 TH/MM3 (ref 4–11)

## 2018-06-03 PROCEDURE — 96374 THER/PROPH/DIAG INJ IV PUSH: CPT

## 2018-06-03 PROCEDURE — 96375 TX/PRO/DX INJ NEW DRUG ADDON: CPT

## 2018-06-03 PROCEDURE — 80053 COMPREHEN METABOLIC PANEL: CPT

## 2018-06-03 PROCEDURE — 83690 ASSAY OF LIPASE: CPT

## 2018-06-03 PROCEDURE — 00731 ANES UPR GI NDSC PX NOS: CPT

## 2018-06-03 PROCEDURE — 85610 PROTHROMBIN TIME: CPT

## 2018-06-03 PROCEDURE — 85730 THROMBOPLASTIN TIME PARTIAL: CPT

## 2018-06-03 PROCEDURE — 96361 HYDRATE IV INFUSION ADD-ON: CPT

## 2018-06-03 PROCEDURE — 82948 REAGENT STRIP/BLOOD GLUCOSE: CPT

## 2018-06-03 PROCEDURE — 43239 EGD BIOPSY SINGLE/MULTIPLE: CPT

## 2018-06-03 PROCEDURE — 81001 URINALYSIS AUTO W/SCOPE: CPT

## 2018-06-03 PROCEDURE — G0378 HOSPITAL OBSERVATION PER HR: HCPCS

## 2018-06-03 PROCEDURE — 99285 EMERGENCY DEPT VISIT HI MDM: CPT

## 2018-06-03 PROCEDURE — 88305 TISSUE EXAM BY PATHOLOGIST: CPT

## 2018-06-03 PROCEDURE — 74176 CT ABD & PELVIS W/O CONTRAST: CPT

## 2018-06-03 PROCEDURE — 85025 COMPLETE CBC W/AUTO DIFF WBC: CPT

## 2018-06-03 RX ADMIN — ZOLPIDEM TARTRATE PRN MG: 5 TABLET, COATED ORAL at 23:16

## 2018-06-03 RX ADMIN — Medication SCH ML: at 21:00

## 2018-06-03 RX ADMIN — OXYTOCIN SCH MLS/HR: 10 INJECTION, SOLUTION INTRAMUSCULAR; INTRAVENOUS at 18:23

## 2018-06-03 RX ADMIN — ACETAMINOPHEN PRN MG: 325 TABLET ORAL at 23:16

## 2018-06-03 RX ADMIN — PANTOPRAZOLE SCH MG: 40 TABLET, DELAYED RELEASE ORAL at 18:23

## 2018-06-03 NOTE — RADRPT
EXAM DATE:  6/3/2018 3:43 PM EDT

AGE/SEX:        67 years / Female



INDICATIONS:  Right lower quadrant pain and nausea.



CLINICAL DATA:  This is the patient's initial encounter. Patient reports that signs and symptoms have
 been present for 3 days and indicates a pain score of 5/10. 

                                                                          

MEDICAL/SURGICAL HISTORY:       Hypothyroidism.  Hypertension. Pulmonary embolism.  Umbilical hernia 
repair.  Hysterectomy. Orthopedic surgery.



RADIATION DOSE:  24.43 CTDI (mGy)









COMPARISON: .



TECHNIQUE:  Multiple contiguous axial images were obtained through the abdomen. Images were obtained 
using multiple row detector helical technique. Using dose reduction techniques, radiation dose was ke
pt as low as reasonably achievable to obtain optimal diagnostic quality images. 



FINDINGS: 

Large right infrahilar mass again seen in the visualized lung base and with a small right pleural eff
usion. 11 mm mass of the right middle lobe not significantly changed. 6 mm mass of the left lower lob
e not significantly changed. There is heterogeneous attenuation enlargement of the right adrenal glan
d measuring 5.6 cm in size. The adrenal gland was previously normal consistent with acute or subacute
 adrenal hemorrhage rather than a metastasis.



Scattered ill-defined metastatic lesions are seen of the liver measuring up to 3.8 cm in size.



Mild edema centered around the pancreas and duodenum. Please correlate clinically for the possibility
 of mild acute pancreatitis. The rest of the small bowel within normal limits. Colon within normal li
mits. Appendix within normal limits.



Spleen within normal limits. 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 No acute renal abnor
mality demonstrated.



CONCLUSION:

1.  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 There are pulmonary a
nd hepatic metastases again noted.

2.  New heterogeneous marked enlargement of the right adrenal gland typical of acute or subacute adre
nal hemorrhage.

3.  Possible pancreatitis and/or duodenitis in the proper clinical setting.

4.  Colon and appendix within normal limits.



Electronically signed by: Gurmeet Benitez MD  6/3/2018 3:56 PM EDT

## 2018-06-03 NOTE — PD
HPI


Chief Complaint:  GI Complaint


Time Seen by Provider:  14:36


Travel History


International Travel<30 days:  No


Contact w/Intl Traveler<30days:  No


Traveled to known affect area:  No





History of Present Illness


HPI


Complaint of right lower quadrant pain











No known drug allergy


Past medical history significant for hypothyroid, corrective lenses, vertigo, 

hypertension, pulmonary embolus, hiatal hernia, total hysterectomy, umbilical 

hernia repair, arthritis, total left hip replacement, diabetes





PFSH


Past Medical History


Arthritis:  Yes


Asthma:  No


Blood Disorders:  No


Anxiety:  No


Depression:  Yes


Heart Rhythm Problems:  No


Cancer:  Yes (MELANOMA 2015, ENDOMETRIAL CANCER, COMPLETE HYSTERECTOMY 2009)


Cardiovascular Problems:  Yes


High Cholesterol:  No


Chemotherapy:  No


Chest Pain:  No


Congestive Heart Failure:  No


COPD:  No


Cerebrovascular Accident:  No


Diabetes:  Yes


Diminished Hearing:  No


Endocrine:  Yes


Gastrointestinal Disorders:  No


GERD:  No


Glaucoma:  No


Genitourinary:  Yes


Headaches:  No


Hepatitis:  No


Hiatal Hernia:  Yes


Hypertension:  Yes


Immune Disorder:  No


Implanted Vascular Access Dvce:  No


Kidney Stones:  No


Musculoskeletal:  Yes


Neurologic:  Yes (HX OF VERTIGO)


Psychiatric:  Yes


Reproductive:  No


Respiratory:  Yes (HX OF MASSIVE PULMONARY EMBOLISM  2001, RECURRED WHEN OFF 

COUMADIN 2014)


Migraines:  No


Myocardial Infarction:  No


Radiation Therapy:  No


Renal Failure:  No


Seizures:  No


Sickle Cell Disease:  No


Sleep Apnea:  No


Thyroid Disease:  Yes (HX OF HYPOTHYROID)


Ulcer:  No


Menopausal:  Yes


Dilation and Curettage (D&C):  Yes (2002 2009)





Past Surgical History


Abdominal Surgery:  Yes (UMBILICAL HERNIA REPAIR 2000)


AICD:  No


Appendectomy:  No


Arteriovenous Shunt:  No


Body Medical Devices:  PESARY


Cardiac Surgery:  No


Cholecystectomy:  No


Ear Surgery:  No


Endocrine Surgery:  No


Eye Surgery:  Yes (CATARACTS REMOVED 2014)


Genitourinary Surgery:  Yes (LESIONS REMOVED FROM VULVA JAN 2015, PESSARY)


Gynecologic Surgery:  Yes (TOTAL HYSTERECTOMY, D & C 2002; D&C 5/09)


Hysterectomy:  Yes


Insulin Pump:  No


Joint Replacement:  Yes (L HIP)


Neurologic Surgery:  No


Oral Surgery:  No


Pacemaker:  No


Thoracic Surgery:  No


Other Surgery:  Yes





Social History


Alcohol Use:  No


Tobacco Use:  No


Substance Use:  No





Allergies-Medications


(Allergen,Severity, Reaction):  


Coded Allergies:  


     No Known Allergies (Verified  Allergy, Unknown, 6/3/18)


Reported Meds & Prescriptions





Reported Meds & Active Scripts


Active


Cardizem CD 24 HR (Diltiazem CD 24 HR) 180 Mg Caper 180 Mg PO DAILY


Reported


Mapap (Acetaminophen) 500 Mg Tab 500 Mg PO Q4-6H PRN


Np Thyroid (Thyroid) 30 Mg Tab 30 Mg PO DAILY


Metformin (Metformin HCl) 1,000 Mg Tab 1,000 Mg PO DAILY


     With a meal


Warfarin 3 Mg Tab 3.5 Mg PO DAILY


Simvastatin 10 Mg Tab 10 Mg PO DAILY


Fluoxetine (Fluoxetine HCl) 20 Mg Tab 20 Mg PO DAILY








Data


Data


Last Documented VS





Vital Signs








  Date Time  Temp Pulse Resp B/P (MAP) Pulse Ox O2 Delivery O2 Flow Rate FiO2


 


6/3/18 16:08   18     


 


6/3/18 15:59  81  135/74 (94) 95 Room Air  


 


6/3/18 13:55 98.3       








Orders





 Orders


Complete Blood Count With Diff (6/3/18 14:37)


Comprehensive Metabolic Panel (6/3/18 14:37)


Lipase (6/3/18 14:37)


Urinalysis - C+S If Indicated (6/3/18 14:37)


Ct Abd/Pel W/O Iv Contrast (6/3/18 14:37)


Iv Access Insert/Monitor (6/3/18 14:37)


Ecg Monitoring (6/3/18 14:37)


Oximetry (6/3/18 14:37)


NPO (6/3/18 14:37)


Morphine Inj (Morphine Inj) (6/3/18 14:45)


Sodium Chlor 0.9% 1000 Ml Inj (Ns 1000 M (6/3/18 14:37)


Sodium Chloride 0.9% Flush (Ns Flush) (6/3/18 14:45)


Metoclopramide Inj (Reglan Inj) (6/3/18 14:45)


Prothrombin Time / Inr (Pt) (6/3/18 15:31)


Act Partial Throm Time (Ptt) (6/3/18 15:31)





Labs





Laboratory Tests








Test


  6/3/18


15:00 6/3/18


15:15


 


White Blood Count 11.8 TH/MM3  


 


Red Blood Count 3.63 MIL/MM3  


 


Hemoglobin 10.4 GM/DL  


 


Hematocrit 30.7 %  


 


Mean Corpuscular Volume 84.6 FL  


 


Mean Corpuscular Hemoglobin 28.7 PG  


 


Mean Corpuscular Hemoglobin


Concent 33.9 % 


  


 


 


Red Cell Distribution Width 13.4 %  


 


Platelet Count 505 TH/MM3  


 


Mean Platelet Volume 6.9 FL  


 


Neutrophils (%) (Auto) 76.2 %  


 


Lymphocytes (%) (Auto) 14.7 %  


 


Monocytes (%) (Auto) 8.0 %  


 


Eosinophils (%) (Auto) 0.9 %  


 


Basophils (%) (Auto) 0.2 %  


 


Neutrophils # (Auto) 9.1 TH/MM3  


 


Lymphocytes # (Auto) 1.7 TH/MM3  


 


Monocytes # (Auto) 0.9 TH/MM3  


 


Eosinophils # (Auto) 0.1 TH/MM3  


 


Basophils # (Auto) 0.0 TH/MM3  


 


CBC Comment DIFF FINAL  


 


Differential Comment   


 


Prothrombin Time 20.7 SEC  


 


Prothromb Time International


Ratio 2.0 RATIO 


  


 


 


Activated Partial


Thromboplast Time 37.5 SEC 


  


 


 


Blood Urea Nitrogen 11 MG/DL  


 


Creatinine 0.53 MG/DL  


 


Random Glucose 91 MG/DL  


 


Total Protein 7.0 GM/DL  


 


Albumin 2.8 GM/DL  


 


Calcium Level 8.6 MG/DL  


 


Alkaline Phosphatase 86 U/L  


 


Aspartate Amino Transf


(AST/SGOT) 13 U/L 


  


 


 


Alanine Aminotransferase


(ALT/SGPT) 16 U/L 


  


 


 


Total Bilirubin 0.5 MG/DL  


 


Sodium Level 137 MEQ/L  


 


Potassium Level 4.0 MEQ/L  


 


Chloride Level 102 MEQ/L  


 


Carbon Dioxide Level 27.8 MEQ/L  


 


Anion Gap 7 MEQ/L  


 


Estimat Glomerular Filtration


Rate 114 ML/MIN 


  


 


 


Lipase 173 U/L  


 


Urine Collection Type  CLEAN CATCH 


 


Urine Color  YELLOW 


 


Urine Turbidity  SL CLOUDY 


 


Urine pH  6.5 


 


Urine Specific Gravity  1.010 


 


Urine Protein  30 mg/dL 


 


Urine Glucose (UA)  NEG mg/dL 


 


Urine Ketones  NEG mg/dL 


 


Urine Occult Blood  LARGE 


 


Urine Nitrite  NEG 


 


Urine Bilirubin  NEG 


 


Urine Urobilinogen  0.2 MG/DL 


 


Urine Leukocyte Esterase  NEG 


 


Urine RBC  50-99 /hpf 


 


Urine WBC  0-2 /hpf 


 


Urine Squamous Epithelial


Cells 


  6-8 /hpf 


 


 


Urine Transitional Epithelial


Cells 


  0-5 /hpf 


 


 


Urine Amorphous Sediment  MOD 


 


Microscopic Urinalysis Comment


  


  CULT NOT


INDICATED


 


Urine Collection Time  1515 











MDM


Medical Decision Making


Medical Screen Exam Complete:  Yes


Emergency Medical Condition:  Yes


Medical Record Reviewed:  Yes


Narrative Course


CBC shows reactive leukocytosis 11.8, mild anemia of 10/30, normal platelet 

count 500,000, no left shift


Adequately anticoagulated 2.0 INR on Coumadin


Electrolytes are within normal limits, normal kidney liver and pancreatic 

functions


UA is not significant for UTI





CT abdomen and pelvis shows large right infrahilar mass consistent with 

chromogenic carcinoma, there is pulmonary and hepatic metastasis again noted, 

colon and appendix within normal limits, possible duodenitis simply or 

pancreatitis in the proper clinical setting.





Diagnosis





 Primary Impression:  


 Acute duodenitis











Yony Sharp MD Will 3, 2018 14:37

## 2018-06-04 VITALS
HEART RATE: 81 BPM | SYSTOLIC BLOOD PRESSURE: 137 MMHG | DIASTOLIC BLOOD PRESSURE: 63 MMHG | RESPIRATION RATE: 18 BRPM | OXYGEN SATURATION: 94 % | TEMPERATURE: 98.2 F

## 2018-06-04 VITALS
RESPIRATION RATE: 18 BRPM | DIASTOLIC BLOOD PRESSURE: 68 MMHG | TEMPERATURE: 98.1 F | OXYGEN SATURATION: 93 % | HEART RATE: 77 BPM | SYSTOLIC BLOOD PRESSURE: 144 MMHG

## 2018-06-04 VITALS
SYSTOLIC BLOOD PRESSURE: 120 MMHG | HEART RATE: 75 BPM | DIASTOLIC BLOOD PRESSURE: 67 MMHG | TEMPERATURE: 98 F | RESPIRATION RATE: 19 BRPM | OXYGEN SATURATION: 94 %

## 2018-06-04 VITALS
DIASTOLIC BLOOD PRESSURE: 72 MMHG | RESPIRATION RATE: 16 BRPM | OXYGEN SATURATION: 93 % | HEART RATE: 76 BPM | SYSTOLIC BLOOD PRESSURE: 136 MMHG | TEMPERATURE: 97.9 F

## 2018-06-04 VITALS
RESPIRATION RATE: 18 BRPM | HEART RATE: 77 BPM | DIASTOLIC BLOOD PRESSURE: 68 MMHG | SYSTOLIC BLOOD PRESSURE: 144 MMHG | TEMPERATURE: 98.1 F | OXYGEN SATURATION: 93 %

## 2018-06-04 VITALS — HEART RATE: 70 BPM

## 2018-06-04 VITALS
DIASTOLIC BLOOD PRESSURE: 65 MMHG | OXYGEN SATURATION: 95 % | TEMPERATURE: 98.5 F | HEART RATE: 80 BPM | RESPIRATION RATE: 18 BRPM | SYSTOLIC BLOOD PRESSURE: 139 MMHG

## 2018-06-04 VITALS — HEART RATE: 77 BPM

## 2018-06-04 VITALS — HEART RATE: 78 BPM

## 2018-06-04 VITALS — HEART RATE: 72 BPM

## 2018-06-04 LAB
ALBUMIN SERPL-MCNC: 2.5 GM/DL (ref 3.4–5)
ALP SERPL-CCNC: 79 U/L (ref 45–117)
ALT SERPL-CCNC: 12 U/L (ref 10–53)
AST SERPL-CCNC: 16 U/L (ref 15–37)
BASOPHILS # BLD AUTO: 0 TH/MM3 (ref 0–0.2)
BASOPHILS NFR BLD: 0.3 % (ref 0–2)
BILIRUB SERPL-MCNC: 0.4 MG/DL (ref 0.2–1)
BUN SERPL-MCNC: 10 MG/DL (ref 7–18)
CALCIUM SERPL-MCNC: 8.2 MG/DL (ref 8.5–10.1)
CHLORIDE SERPL-SCNC: 104 MEQ/L (ref 98–107)
CREAT SERPL-MCNC: 0.74 MG/DL (ref 0.5–1)
EOSINOPHIL # BLD: 0.1 TH/MM3 (ref 0–0.4)
EOSINOPHIL NFR BLD: 1.3 % (ref 0–4)
ERYTHROCYTE [DISTWIDTH] IN BLOOD BY AUTOMATED COUNT: 14.5 % (ref 11.6–17.2)
GFR SERPLBLD BASED ON 1.73 SQ M-ARVRAT: 78 ML/MIN (ref 89–?)
GLUCOSE SERPL-MCNC: 132 MG/DL (ref 74–106)
HCO3 BLD-SCNC: 25.2 MEQ/L (ref 21–32)
HCT VFR BLD CALC: 29 % (ref 35–46)
HGB BLD-MCNC: 9.9 GM/DL (ref 11.6–15.3)
LYMPHOCYTES # BLD AUTO: 1.3 TH/MM3 (ref 1–4.8)
LYMPHOCYTES NFR BLD AUTO: 14.3 % (ref 9–44)
MCH RBC QN AUTO: 28.8 PG (ref 27–34)
MCHC RBC AUTO-ENTMCNC: 34.1 % (ref 32–36)
MCV RBC AUTO: 84.6 FL (ref 80–100)
MONOCYTE #: 0.8 TH/MM3 (ref 0–0.9)
MONOCYTES NFR BLD: 8.9 % (ref 0–8)
NEUTROPHILS # BLD AUTO: 6.7 TH/MM3 (ref 1.8–7.7)
NEUTROPHILS NFR BLD AUTO: 75.2 % (ref 16–70)
PLATELET # BLD: 428 TH/MM3 (ref 150–450)
PMV BLD AUTO: 6.8 FL (ref 7–11)
PROT SERPL-MCNC: 6.2 GM/DL (ref 6.4–8.2)
RBC # BLD AUTO: 3.43 MIL/MM3 (ref 4–5.3)
SODIUM SERPL-SCNC: 140 MEQ/L (ref 136–145)
WBC # BLD AUTO: 8.9 TH/MM3 (ref 4–11)

## 2018-06-04 RX ADMIN — ACETAMINOPHEN PRN MG: 325 TABLET ORAL at 16:41

## 2018-06-04 RX ADMIN — ACETAMINOPHEN PRN MG: 325 TABLET ORAL at 22:39

## 2018-06-04 RX ADMIN — Medication SCH ML: at 08:42

## 2018-06-04 RX ADMIN — OXYTOCIN SCH MLS/HR: 10 INJECTION, SOLUTION INTRAMUSCULAR; INTRAVENOUS at 22:39

## 2018-06-04 RX ADMIN — OXYTOCIN SCH MLS/HR: 10 INJECTION, SOLUTION INTRAMUSCULAR; INTRAVENOUS at 08:42

## 2018-06-04 RX ADMIN — ZOLPIDEM TARTRATE PRN MG: 5 TABLET, COATED ORAL at 22:38

## 2018-06-04 RX ADMIN — THYROID, PORCINE SCH MG: 30 TABLET ORAL at 08:40

## 2018-06-04 RX ADMIN — INSULIN ASPART SCH: 100 INJECTION, SOLUTION INTRAVENOUS; SUBCUTANEOUS at 21:00

## 2018-06-04 RX ADMIN — PANTOPRAZOLE SCH MG: 40 TABLET, DELAYED RELEASE ORAL at 08:39

## 2018-06-04 RX ADMIN — ACETAMINOPHEN PRN MG: 325 TABLET ORAL at 08:42

## 2018-06-04 RX ADMIN — DILTIAZEM HYDROCHLORIDE SCH MG: 180 CAPSULE, EXTENDED RELEASE ORAL at 08:40

## 2018-06-04 RX ADMIN — OXYTOCIN SCH MLS/HR: 10 INJECTION, SOLUTION INTRAMUSCULAR; INTRAVENOUS at 03:35

## 2018-06-04 RX ADMIN — Medication SCH ML: at 22:39

## 2018-06-04 RX ADMIN — INSULIN ASPART SCH: 100 INJECTION, SOLUTION INTRAVENOUS; SUBCUTANEOUS at 16:42

## 2018-06-04 RX ADMIN — PRAVASTATIN SODIUM SCH MG: 20 TABLET ORAL at 08:40

## 2018-06-04 NOTE — HHI.HP
History of Present Illness


Primary Care Physician


Desmond Mcintyre, DO


Admission Diagnosis





DUODENITIS, POSSIBLE SUBACUTE ADRENAL HEMORRHAGE ON COUMADIN


Diagnoses:  


History of Present Illness


Patient presents for RLQ pain  she states it has been present for several weeks

, It comes and goes. She denies any nausea, vomiting or diarrhea associated 

with pain.


She is under care of oncology Dr Jacques for melanoma, Lung ca with mets.


She is on long term Coumadin for for DVT/PE.





Review of Systems


Constitutional:  COMPLAINS OF: Fatigue


Gastrointestinal:  COMPLAINS OF: Abdominal pain, Anorexia





Past Family Social History


Allergies:  


Coded Allergies:  


     No Known Allergies (Verified  Allergy, Unknown, 6/3/18)


Past Medical History


hypothyroid


Vertigo


HTN


PE


Hiatal Hernia


Arthritis 


DM


Melanoma


Past Surgical History


Hysterectomy 2009


Hernia repair


Cataracts removed


L NIRANJAN


Reported Medications


Reported


Mapap (Acetaminophen) 500 Mg Tab 500 Mg PO Q4-6H PRN


Np Thyroid (Thyroid) 30 Mg Tab 30 Mg PO DAILY


Metformin (Metformin HCl) 1,000 Mg Tab 1,000 Mg PO DAILY


     With a meal


Warfarin 3 Mg Tab 3.5 Mg PO DAILY


Simvastatin 10 Mg Tab 10 Mg PO DAILY


Fluoxetine (Fluoxetine HCl) 20 Mg Tab 20 Mg PO DAILY


Active Ordered Medications





Current Medications








 Medications


  (Trade)  Dose


 Ordered  Sig/Yi


 Route  Start Time


 Stop Time Status Last Admin


 


 Lactated Ringer's  1,000 ml @ 


 100 mls/hr  Q10H


 IV  6/3/18 17:35


    18 08:42


 


 


  (NS Flush)  2 ml  UNSCH  PRN


 IV FLUSH  6/3/18 17:45


     


 


 


  (NS Flush)  2 ml  BID


 IV FLUSH  6/3/18 21:00


     


 


 


  (Tylenol)  650 mg  Q4H  PRN


 PO  6/3/18 17:45


    18 08:42


 


 


  (Morphine Inj)  2 mg  Q2H  PRN


 IV PUSH  6/3/18 17:45


    18 06:12


 


 


  (Reglan Inj)  10 mg  Q6H  PRN


 IV PUSH  6/3/18 17:45


    18 06:18


 


 


  (Ambien)  5 mg  HS  PRN


 PO  6/3/18 17:45


    6/3/18 23:16


 


 


  (Protonix)  40 mg  DAILY


 PO  6/3/18 17:45


    18 08:39


 


 


  (Cardizem Cd)  180 mg  DAILY


 PO  6/4/18 09:00


    18 08:40


 


 


  (PROzac)  20 mg  DAILY


 PO  18 09:00


    18 08:40


 


 


  (Reddick Thyroid)  30 mg  DAILY


 PO  18 09:00


    18 08:40


 


 


  (Pravachol)  20 mg  DAILY


 PO  18 09:00


    18 08:40


 








Social History


Denies Tobacco, ETOH





Physical Exam


Vital Signs





Vital Signs








  Date Time  Temp Pulse Resp B/P (MAP) Pulse Ox O2 Delivery O2 Flow Rate FiO2


 


18 08:00 98.2 79 18 137/63 (87) 94   


 


18 04:40 98.0 75 19 120/67 (84) 94   


 


18 04:00  70      


 


18 02:20  77      


 


6/3/18 22:30 98.0 84 18 128/65 (86) 94   


 


6/3/18 21:56  75 20 124/68 (86) 98   


 


6/3/18 21:26  79 20 126/68 (87) 96   


 


6/3/18 19:21  91 20 115/73 (87) 93   


 


6/3/18 19:21   20     


 


6/3/18 18:29  81 18 148/77 (100) 95 Room Air  


 


6/3/18 17:10  73 18 140/65 (90) 94 Room Air  


 


6/3/18 16:08   18     


 


6/3/18 15:59  81 18 135/74 (94) 95 Room Air  


 


6/3/18 14:43     100 Room Air  


 


6/3/18 13:55 98.3 90 18 145/67 (93) 93   








Physical Exam


GENERAL: This is a well-nourished, well-developed patient, in no apparent 

distress.


SKIN: No rashes, ecchymoses or lesions. warm and dry.


HEAD: Atraumatic. Normocephalic. No temporal or scalp tenderness.


EYES: Pupils equal round and reactive. Extraocular motions intact. No scleral 

icterus. No injection or drainage. 


ENT: Nose without bleeding, purulent drainage or septal hematoma. Airway patent.


NECK: Trachea midline. No JVD or lymphadenopathy. Supple, nontender, no 

meningeal signs.


CARDIOVASCULAR: Regular rate and rhythm without murmurs, gallops, or rubs. 


RESPIRATORY: Clear to auscultation. Breath sounds equal bilaterally. No wheezes

, rales, or rhonchi.  


GASTROINTESTINAL: Abdomen soft, Tender to RUQ, BS present No guarding.


MUSCULOSKELETAL: Extremities without clubbing, cyanosis, or edema. Negative 

Homans sign bilaterally.


NEUROLOGICAL: Awake and alert. Cranial nerves II through XII intact.Normal 

speech.


Laboratory





Laboratory Tests








Test


  6/3/18


15:00 6/3/18


15:15


 


White Blood Count 11.8  


 


Red Blood Count 3.63  


 


Hemoglobin 10.4  


 


Hematocrit 30.7  


 


Mean Corpuscular Volume 84.6  


 


Mean Corpuscular Hemoglobin 28.7  


 


Mean Corpuscular Hemoglobin


Concent 33.9 


  


 


 


Red Cell Distribution Width 13.4  


 


Platelet Count 505  


 


Mean Platelet Volume 6.9  


 


Neutrophils (%) (Auto) 76.2  


 


Lymphocytes (%) (Auto) 14.7  


 


Monocytes (%) (Auto) 8.0  


 


Eosinophils (%) (Auto) 0.9  


 


Basophils (%) (Auto) 0.2  


 


Neutrophils # (Auto) 9.1  


 


Lymphocytes # (Auto) 1.7  


 


Monocytes # (Auto) 0.9  


 


Eosinophils # (Auto) 0.1  


 


Basophils # (Auto) 0.0  


 


CBC Comment DIFF FINAL  


 


Differential Comment   


 


Prothrombin Time 20.7  


 


Prothromb Time International


Ratio 2.0 


  


 


 


Activated Partial


Thromboplast Time 37.5 


  


 


 


Blood Urea Nitrogen 11  


 


Creatinine 0.53  


 


Random Glucose 91  


 


Total Protein 7.0  


 


Albumin 2.8  


 


Calcium Level 8.6  


 


Alkaline Phosphatase 86  


 


Aspartate Amino Transf


(AST/SGOT) 13 


  


 


 


Alanine Aminotransferase


(ALT/SGPT) 16 


  


 


 


Total Bilirubin 0.5  


 


Sodium Level 137  


 


Potassium Level 4.0  


 


Chloride Level 102  


 


Carbon Dioxide Level 27.8  


 


Anion Gap 7  


 


Estimat Glomerular Filtration


Rate 114 


  


 


 


Lipase 173  


 


Urine Collection Type  CLEAN CATCH 


 


Urine Color  YELLOW 


 


Urine Turbidity  SL CLOUDY 


 


Urine pH  6.5 


 


Urine Specific Gravity  1.010 


 


Urine Protein  30 


 


Urine Glucose (UA)  NEG 


 


Urine Ketones  NEG 


 


Urine Occult Blood  LARGE 


 


Urine Nitrite  NEG 


 


Urine Bilirubin  NEG 


 


Urine Urobilinogen  0.2 


 


Urine Leukocyte Esterase  NEG 


 


Urine RBC  50-99 


 


Urine WBC  0-2 


 


Urine Squamous Epithelial


Cells 


  6-8 


 


 


Urine Transitional Epithelial


Cells 


  0-5 


 


 


Urine Amorphous Sediment  MOD 


 


Microscopic Urinalysis Comment


  


  CULT NOT


INDICATED


 


Urine Collection Time  1515 








Result Diagram:  


6/3/18 1500                                                                    

            6/3/18 1500





Imaging





Last 24 hours Impressions








Abdomen/Pelvis CT 6/3/18 1437 Signed





Impressions: 





 CONCLUSION:





 1.  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





  There are pulmonary and hepatic metastases again noted.





 2.  New heterogeneous marked enlargement of the right adrenal gland typical of 





 acute or subacute adrenal hemorrhage.





 3.  Possible pancreatitis and/or duodenitis in the proper clinical setting.





 4.  Colon and appendix within normal limits.





  





 











Caprini VTE Risk Assessment


Caprini VTE Risk Assessment:  Mod/High Risk (score >= 2)


Caprini Risk Assessment Model











 Point Value = 1          Point Value = 2  Point Value = 3  Point Value = 5


 


Age 41-60


Minor surgery


BMI > 25 kg/m2


Swollen legs


Varicose veins


Pregnancy or postpartum


History of unexplained or recurrent


   spontaneous 


Oral contraceptives or hormone


   replacement


Sepsis (< 1 month)


Serious lung disease, including


   pneumonia (< 1 month)


Abnormal pulmonary function


Acute myocardial infarction


Congestive heart failure (< 1 month)


History of inflammatory bowel disease


Medical patient at bed rest Age 61-74


Arthroscopic surgery


Major open surgery (> 45 min)


Laparoscopic surgery (> 45 min)


Malignancy


Confined to bed (> 72 hours)


Immobilizing plaster cast


Central venous access Age >= 75


History of VTE


Family history of VTE


Factor V Leiden


Prothrombin 55850D


Lupus anticoagulant


Anticardiolipin antibodies


Elevated serum homocysteine


Heparin-induced thrombocytopenia


Other congenital or acquired


   thrombophilia Stroke (< 1 month)


Elective arthroplasty


Hip, pelvis, or leg fracture


Acute spinal cord injury (< 1 month)








Prophylaxis Regimen











   Total Risk


Factor Score Risk Level Prophylaxis Regimen


 


0-1      Low Early ambulation


 


2 Moderate Order ONE of the following:


*Sequential Compression Device (SCD)


*Heparin 5000 units SQ BID


 


3-4 Higher Order ONE of the following medications:


*Heparin 5000 units SQ TID


*Enoxaparin/Lovenox 40 mg SQ daily (WT < 150 kg, CrCl > 30 mL/min)


*Enoxaparin/Lovenox 30 mg SQ daily (WT < 150 kg, CrCl > 10-29 mL/min)


*Enoxaparin/Lovenox 30 mg SQ BID (WT < 150 kg, CrCl > 30 mL/min)


AND/OR


*Sequential Compression Device (SCD)


 


5 or more Highest Order ONE of the following medications:


*Heparin 5000 units SQ TID (Preferred with Epidurals)


*Enoxaparin/Lovenox 40 mg SQ daily (WT < 150 kg, CrCl > 30 mL/min)


*Enoxaparin/Lovenox 30 mg SQ daily (WT < 150 kg, CrCl > 10-29 mL/min)


*Enoxaparin/Lovenox 30 mg SQ BID (WT < 150 kg, CrCl > 30 mL/min)


AND


*Sequential Compression Device (SCD)











Assessment and Plan


Problem List:  


(1) Abdominal pain


ICD Codes:  R10.9 - Unspecified abdominal pain


Plan:  likely Duodenitis


Gi Consulted


NPO





(2) Paroxysmal atrial fibrillation with rapid ventricular response


ICD Codes:  I48.0 - Paroxysmal atrial fibrillation


Status:  Acute


Plan:  Rate controlled, Cardizem


Coumadin on hold. 





(3) Hypertension


ICD Codes:  I10 - Essential (primary) hypertension


Plan:  Cont home medications





(4) Diabetes mellitus


ICD Codes:  E11.9 - Type 2 diabetes mellitus without complications


Plan:  Monitor, SC coverage


Metformin on hold. 





(5) Hypothyroid


ICD Codes:  E03.9 - Hypothyroidism, unspecified


Plan:  Cont home medications





(6) History of pulmonary embolism


ICD Codes:  Z86.711 - Personal history of pulmonary embolism


Plan:  Long term Coumadin, On hold currently





(7) Lung mass


ICD Codes:  R91.8 - Other nonspecific abnormal finding of lung field


Plan:  Lung ca with mets to liver. She is followed by Anne Marie Rocha 2018 09:48

## 2018-06-04 NOTE — GIPROC
[facility]

[address] [facility]. [facility], [address]

 

 

EGD PROCEDURE REPORT     EXAM DATE: 06/04/2018

 

PATIENT NAME:      Kiersten Nugent           MR #:      [id number]

YOB: 1950      VISIT #:     [id number]

ATTENDING:     Beth Chris MD     ORDER #:     [id number]

ASSISTANT:      Bandar Lehman and Kristi Aguero     STATUS:     inpatient

 

 

INDICATIONS:  The patient is a 67 yr old female here for an EGD due to abdominal

pain in the right upper quadrant

PROCEDURE PERFORMED:     EGD w/ biopsy

MEDICATIONS:     None and Per Anesthesia.

TOPICAL ANESTHETIC:     none

 

CONSENT: The patient understands the risks and benefits of the procedure and

understands that these risks include, but are not limited to: sedation,

allergic reaction, infection, perforation and/or bleeding. Alternative means of

evaluation and treatment include, among others: physical exam, x-rays, and/or

surgical intervention. The patient elects to proceed with this endoscopic

procedure.

 



medical equipment was checked for proper function. Hand hygiene and appropriate

measures for infection prevention was taken. After the risks, benefits and

alternatives of the procedure were thoroughly explained, Informed consent was

verified, confirmed and timeout was successfully executed by the treatment

team. The patient was anesthetized with topical anesthesia and the Pentax

[id number] endoscope was introduced through the mouth and advanced to the second

portion of the duodenum.  Retroflexion was performed and was normal  The

gastroscope was then slowly withdrawn and removed.

 

ESOPHAGUS: There was LA Class A esophagitis noted.   A medium sized hiatal

hernia was noted.   Multiple biopsies were performed.  Sample sent for

histology.

 

STOMACH: There was mild gastritis in the gastric antrum.

 

DUODENUM: The duodenal mucosa appeared normal in the bulb and second portion of

the duodenum.

 

 

 

ADVERSE EVENTS:     There were no complications.

IMPRESSIONS:     1.  There was LA Class A esophagitis noted

2.  Medium sized hiatal hernia

3.  There was mild gastritis in the gastric antrum

4.  Normal duodenal mucosa in the bulb and second portion of the duodenum

5.  Retroflexion was performed and was normal

 

RECOMMENDATIONS:     1.  Await biopsy results.  Biopsy results will not be ready

for 7-10 days.  If you don't hear from us in two weeks, call our office for

biopsy results.

2.  Continue PPI

PATIENT CONDITION:     stable

DISPOSITION:     Observation

REPEAT EXAM:     NONE

 

 

___________________________________

Beth Chris MD

eSigned:  Beth Chris MD 06/04/2018 1:10 PM

 

 

cc:

 

 

 

 

PATIENT NAME:  Kiersten Nugent

MR#: [id number]

## 2018-06-04 NOTE — PD.CONS
HPI


History of Present Illness


This is a 67 year old F with PMH significant for melanoma S/P surgical removal 

and lymph node resection who developed metastasis to her lung and was told 

yesterday that her CT scan also showed metastasis to the liver.  Pts oncologist 

is Dr. Burkett, and she states she was planning on starting her on immunotherapy 

this Wednesday and PET scan on Friday. Other medical history includes previous 

DVT and PE, pt is on Coumadin, she reports that she had her INR checked a week 

ago and it was 7.9 so she has been of Coumadin since.  Of note also has a 

Holter monitor because during recent bronchoscopy pt went into new onset atrial 

fibrillation with rapid ventricular rate.  Pt presented to the ER yesterday 

with complaints of right sided abdominal pain for the past 4-5 days, she report 

that pain is in her right mid abdomen, intermittent but increasing in 

frequency. Initially pain was sharp but now is an aching pain. She denies any 

relation to PO intake or bowel movements. Of note, has not been eating much 

because she reports her appetite has been poor, has lost 8 lbs unintentionally 

over the past month or two.  Also some mild tenderness to the RLQ with deep 

palpation. Pt reports some nausea but denies emesis. Denies constipation, 

diarrhea, blood in stool.  CT abdomen and pelvis revealed possible pancreatitis 

and/or duodenitis in the proper clinical setting. Lipase currently WNL. Pt 

reports one prior episode of pancreatitis in 2013, denies ETOH, she is unclear 

of the etiology.  Has never had EGD. Last colonoscopy done in 2014 by Dr. Adams with findings of colon polyps, she was due for repeat colonoscopy in 

June but has been unable to go.  She thinks family history significant for 

"stomach" cancer, her grandmother.  Denies ETOH, smoking, NSAID use. 


 (Jess Gonzalez)





PFSH


Past Medical History


Melanoma


Metastasis to lung and liver 


History of PE and DVT


Hypothyroidism


DM


Past Surgical History


Hysterectomy


Umbilical hernia repair


Hip replacement


Lesions removed from vulva


Bronchoscopy


Colonoscopy 


Surgical resection of melanoma with lymph node resection 


 (Jess Gonzalez)


Coded Allergies:  


     No Known Allergies (Verified  Allergy, Unknown, 6/3/18)


Social History


Denies ETOH


Quit smoking 30 years ago


Denies illicit drug use


Lives alone 


 (Jess Gonzalez)





Review of Systems


Gastrointestinal:  COMPLAINS OF: Abdominal pain, Nausea, Heartburn, DENIES: 

Black stools, Bloody stools, Constipation, Diarrhea, Vomiting, Difficulty 

Swallowing, Odynophagia, Swelling of Abdomen, Hematemesis (Jess Gonzalez)





GI Exam


Vitals I&O





Vital Signs








  Date Time  Temp Pulse Resp B/P (MAP) Pulse Ox O2 Delivery O2 Flow Rate FiO2


 


6/4/18 08:00  81      


 


6/4/18 08:00 98.2 79 18 137/63 (87) 94   


 


6/4/18 04:40 98.0 75 19 120/67 (84) 94   


 


6/4/18 04:00  70      


 


6/4/18 02:20  77      


 


6/3/18 22:30 98.0 84 18 128/65 (86) 94   


 


6/3/18 21:56  75 20 124/68 (86) 98   


 


6/3/18 21:26  79 20 126/68 (87) 96   


 


6/3/18 19:21  91 20 115/73 (87) 93   


 


6/3/18 19:21   20     


 


6/3/18 18:29  81 18 148/77 (100) 95 Room Air  


 


6/3/18 17:10  73 18 140/65 (90) 94 Room Air  


 


6/3/18 16:08   18     


 


6/3/18 15:59  81 18 135/74 (94) 95 Room Air  


 


6/3/18 14:43     100 Room Air  


 


6/3/18 13:55 98.3 90 18 145/67 (93) 93   














I/O      


 


 6/3/18 6/3/18 6/3/18 6/4/18 6/4/18 6/4/18





 07:00 15:00 23:00 07:00 15:00 23:00


 


Intake Total   250 ml 0 ml  


 


Balance   250 ml 0 ml  


 


      


 


Intake Oral    0 ml  


 


IV Total   250 ml   


 


# Voids   1 2  


 


# Bowel Movements    0  








Imaging





Last Impressions








Abdomen/Pelvis CT 6/3/18 7426 Signed





Impressions: 





 CONCLUSION:





 1.  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





  There are pulmonary and hepatic metastases again noted.





 2.  New heterogeneous marked enlargement of the right adrenal gland typical of 





 acute or subacute adrenal hemorrhage.





 3.  Possible pancreatitis and/or duodenitis in the proper clinical setting.





 4.  Colon and appendix within normal limits.





  





 








Laboratory











Test


  6/3/18


15:00 6/3/18


15:15


 


White Blood Count 11.8 TH/MM3  


 


Red Blood Count 3.63 MIL/MM3  


 


Hemoglobin 10.4 GM/DL  


 


Hematocrit 30.7 %  


 


Mean Corpuscular Volume 84.6 FL  


 


Mean Corpuscular Hemoglobin 28.7 PG  


 


Mean Corpuscular Hemoglobin


Concent 33.9 % 


  


 


 


Red Cell Distribution Width 13.4 %  


 


Platelet Count 505 TH/MM3  


 


Mean Platelet Volume 6.9 FL  


 


Neutrophils (%) (Auto) 76.2 %  


 


Lymphocytes (%) (Auto) 14.7 %  


 


Monocytes (%) (Auto) 8.0 %  


 


Eosinophils (%) (Auto) 0.9 %  


 


Basophils (%) (Auto) 0.2 %  


 


Neutrophils # (Auto) 9.1 TH/MM3  


 


Lymphocytes # (Auto) 1.7 TH/MM3  


 


Monocytes # (Auto) 0.9 TH/MM3  


 


Eosinophils # (Auto) 0.1 TH/MM3  


 


Basophils # (Auto) 0.0 TH/MM3  


 


CBC Comment DIFF FINAL  


 


Differential Comment   


 


Prothrombin Time 20.7 SEC  


 


Prothromb Time International


Ratio 2.0 RATIO 


  


 


 


Activated Partial


Thromboplast Time 37.5 SEC 


  


 


 


Blood Urea Nitrogen 11 MG/DL  


 


Creatinine 0.53 MG/DL  


 


Random Glucose 91 MG/DL  


 


Total Protein 7.0 GM/DL  


 


Albumin 2.8 GM/DL  


 


Calcium Level 8.6 MG/DL  


 


Alkaline Phosphatase 86 U/L  


 


Aspartate Amino Transf


(AST/SGOT) 13 U/L 


  


 


 


Alanine Aminotransferase


(ALT/SGPT) 16 U/L 


  


 


 


Total Bilirubin 0.5 MG/DL  


 


Sodium Level 137 MEQ/L  


 


Potassium Level 4.0 MEQ/L  


 


Chloride Level 102 MEQ/L  


 


Carbon Dioxide Level 27.8 MEQ/L  


 


Anion Gap 7 MEQ/L  


 


Estimat Glomerular Filtration


Rate 114 ML/MIN 


  


 


 


Lipase 173 U/L  


 


Urine Collection Type  CLEAN CATCH 


 


Urine Color  YELLOW 


 


Urine Turbidity  SL CLOUDY 


 


Urine pH  6.5 


 


Urine Specific Gravity  1.010 


 


Urine Protein  30 mg/dL 


 


Urine Glucose (UA)  NEG mg/dL 


 


Urine Ketones  NEG mg/dL 


 


Urine Occult Blood  LARGE 


 


Urine Nitrite  NEG 


 


Urine Bilirubin  NEG 


 


Urine Urobilinogen  0.2 MG/DL 


 


Urine Leukocyte Esterase  NEG 


 


Urine RBC  50-99 /hpf 


 


Urine WBC  0-2 /hpf 


 


Urine Squamous Epithelial


Cells 


  6-8 /hpf 


 


 


Urine Transitional Epithelial


Cells 


  0-5 /hpf 


 


 


Urine Amorphous Sediment  MOD 


 


Microscopic Urinalysis Comment


  


  CULT NOT


INDICATED


 


Urine Collection Time  1515 








Physical Examination


HEENT: Normocephalic; atraumatic


CHEST:  Even/unlabored 


CARDIAC:  Irregular, rate controlled 


ABDOMEN:  Soft, nondistended, RUQ tenderness, mild RLQ tenderness with deep 

palpation, bowel sounds active 


SKIN:  Normal; no rash; no jaundice.


CNS:  Alert and oriented times three.


 (Jess Gonzalez)





Assessment and Plan


Plan


Assessment:


- RUQ pain, tenderness


  Pt reports pain for the past 4-5 days, intermittent, becoming more frequent,


  initially described as sharp, now aching. Denies relation with PO intake,


  bowel movements. 


  Also having some mild RLQ tenderness with deep palpation. Denies 


  constipation, diarrhea, blood in stool. 


  Has never had EGD.


  Last colonoscopy was in 2014 by Dr. Adams, reports of colon polyps was


  due for repeat procedure in June, has not had time. 


  CT abdomen and pelvis WO IV contrast (6/3) --> Possible pancreatitis and/or 


  duodenitis in the proper clinical setting. Colon and appendix WNL.


  Pt reports one history of pancreatitis in 2013, denies ETOH, she is unsure


  of etiology. Of note, denies previous cholecystectomy. 


  Lipase currently WNL


- Melanoma S/P surgical resection and lymph node resection with metastasis


  to the liver and lung- pt followed by oncologist Dr. Burkett, planning on 


  starting immunotherapy this Wednesday. Denies previous chemo or radiation


- History of PE and DVT- new onset A-fib RVR during recent bronchoscopy- 


  has Holter monitor- on Coumadin- INR was 7.9 a week ago- has been off


  Coumadin since, INR currently 2 


- New heterogeneous marked enlargement of the right adrenal gland typical 


  of acute or subacute adrenal hemorrhage on CT- per attending





Plan:


EGD today


Obtain consent


Keep NPO


Further recommendations based on findings of above and clinical course





Pt has been seen and examined by myself and Dr. Chris and this note is written 

on hsi behalf 


 (Jess Gonzalez)


Physician Comments


Seen with Jess plan as above, consent obtained for EGD today.


Further recommendations to follow based on findings.


Thank you for the consult.


 (Beth Chris MD)











Jess Gonzalez Jun 4, 2018 11:18


Beth Chris MD Jun 4, 2018 12:39

## 2018-06-05 VITALS
HEART RATE: 72 BPM | SYSTOLIC BLOOD PRESSURE: 134 MMHG | DIASTOLIC BLOOD PRESSURE: 71 MMHG | TEMPERATURE: 98.4 F | OXYGEN SATURATION: 94 % | RESPIRATION RATE: 18 BRPM

## 2018-06-05 VITALS
RESPIRATION RATE: 19 BRPM | TEMPERATURE: 98 F | OXYGEN SATURATION: 94 % | HEART RATE: 85 BPM | SYSTOLIC BLOOD PRESSURE: 154 MMHG | DIASTOLIC BLOOD PRESSURE: 71 MMHG

## 2018-06-05 VITALS — HEART RATE: 71 BPM

## 2018-06-05 VITALS
TEMPERATURE: 97.8 F | SYSTOLIC BLOOD PRESSURE: 100 MMHG | OXYGEN SATURATION: 19 % | RESPIRATION RATE: 18 BRPM | HEART RATE: 71 BPM

## 2018-06-05 RX ADMIN — PRAVASTATIN SODIUM SCH MG: 20 TABLET ORAL at 09:06

## 2018-06-05 RX ADMIN — Medication SCH ML: at 09:07

## 2018-06-05 RX ADMIN — DILTIAZEM HYDROCHLORIDE SCH MG: 180 CAPSULE, EXTENDED RELEASE ORAL at 09:06

## 2018-06-05 RX ADMIN — INSULIN ASPART SCH: 100 INJECTION, SOLUTION INTRAVENOUS; SUBCUTANEOUS at 08:00

## 2018-06-05 RX ADMIN — PANTOPRAZOLE SCH MG: 40 TABLET, DELAYED RELEASE ORAL at 09:06

## 2018-06-05 RX ADMIN — THYROID, PORCINE SCH MG: 30 TABLET ORAL at 09:06

## 2018-06-05 RX ADMIN — OXYTOCIN SCH MLS/HR: 10 INJECTION, SOLUTION INTRAMUSCULAR; INTRAVENOUS at 09:11

## 2018-06-05 NOTE — HHI.DS
Discharge Summary


Admission Date


Will 3, 2018 at 18:16


Admitting Diagnosis





DUODENITIS, POSSIBLE SUBACUTE ADRENAL HEMORRHAGE ON COUMADIN





Brief History


Patient presents for RLQ pain  she states it has been present for several weeks

, It comes and goes. She denies any nausea, vomiting or diarrhea associated 

with pain.


She is under care of oncology Dr Jacques for melanoma, Lung ca with mets.


She is on long term Coumadin for for DVT/PE.


CBC/BMP:  


6/4/18 1603                                                                    

            6/4/18 1603





Significant Findings





Laboratory Tests








Test


  6/3/18


15:00 6/3/18


15:15 6/4/18


16:03


 


White Blood Count


  11.8 TH/MM3


(4.0-11.0) 


  


 


 


Red Blood Count


  3.63 MIL/MM3


(4.00-5.30) 


  3.43 MIL/MM3


(4.00-5.30)


 


Hemoglobin


  10.4 GM/DL


(11.6-15.3) 


  9.9 GM/DL


(11.6-15.3)


 


Hematocrit


  30.7 %


(35.0-46.0) 


  29.0 %


(35.0-46.0)


 


Platelet Count


  505 TH/MM3


(150-450) 


  


 


 


Mean Platelet Volume


  6.9 FL


(7.0-11.0) 


  6.8 FL


(7.0-11.0)


 


Neutrophils (%) (Auto)


  76.2 %


(16.0-70.0) 


  75.2 %


(16.0-70.0)


 


Neutrophils # (Auto)


  9.1 TH/MM3


(1.8-7.7) 


  


 


 


Prothrombin Time


  20.7 SEC


(9.8-11.6) 


  


 


 


Activated Partial


Thromboplast Time 37.5 SEC


(24.3-30.1) 


  


 


 


Albumin


  2.8 GM/DL


(3.4-5.0) 


  2.5 GM/DL


(3.4-5.0)


 


Aspartate Amino Transf


(AST/SGOT) 13 U/L (15-37) 


  


  


 


 


Urine Protein


  


  30 mg/dL


(NEG-TRACE) 


 


 


Urine Occult Blood  LARGE (NEG)  


 


Urine RBC


  


  50-99 /hpf


(0-3) 


 


 


Urine Squamous Epithelial


Cells 


  6-8 /hpf (0-5) 


  


 


 


Monocytes (%) (Auto)


  


  


  8.9 %


(0.0-8.0)


 


Random Glucose


  


  


  132 MG/DL


()


 


Total Protein


  


  


  6.2 GM/DL


(6.4-8.2)


 


Calcium Level


  


  


  8.2 MG/DL


(8.5-10.1)


 


Estimat Glomerular Filtration


Rate 


  


  78 ML/MIN


(>89)








Hospital Course


Abdomen CT- 


1.  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 

There are pulmonary and hepatic metastases again noted.


2.  New heterogeneous marked enlargement of the right adrenal gland typical of 

acute or subacute adrenal hemorrhage.


3.  Possible pancreatitis and/or duodenitis in the proper clinical setting.


4.  Colon and appendix within normal limits.





EGD on 6/4 showing


Class A esophagitis, medium sized hiatal hernia, mild gastritis in the


gastric antrum, normal duodenal mucosa in the bulb and second portion of


the duodenum





DC home with PPI


Pt Condition on Discharge:  Stable


Discharge Disposition:  Discharge Home


Discharge Instructions


DIET: Follow Instructions for:  As Tolerated, No Restrictions


Speech Therapy-Diet Recommenda:  Regular


Activities you can perform:  Regular-No Restrictions


Follow up Referrals:  


PCP Follow-up with Dr Mcintyre F/U Friday next week, for INR check





New Medications:  


Pantoprazole (Pantoprazole) 40 Mg Tab


40 MG PO DAILY for gastritis, #30 TAB





 


Continued Medications:  


Acetaminophen (Mapap) 500 Mg Tab


500 MG PO Q4-6H PRN for PAIN, TAB 0 Refills





Diltiazem CD 24 HR (Cardizem CD 24 HR) 180 Mg Caper


180 MG PO DAILY for cardiac, #30 CAP





Fluoxetine (Fluoxetine) 20 Mg Tab


20 MG PO DAILY, #30 TAB 0 Refills





Metformin (Metformin) 1,000 Mg Tab


1000 MG PO DAILY for Blood Sugar Management, #30 TAB 0 Refills


With a meal


Simvastatin (Simvastatin) 10 Mg Tab


10 MG PO DAILY for Cholesterol Management, #30 TAB 0 Refills





Thyroid (Np Thyroid) 30 Mg Tab


30 MG PO DAILY for Thyroid Supplement, #30 TAB 0 Refills





Warfarin (Warfarin) 3 Mg Tab


3.5 MG PO DAILY for Blood Clot Prevention, #30 TAB 0 Refills








Additional Information


Restart Coumadin 2mg daily. Follow up in office Friday to have INR checked. 

This was discussed with patient at bedside with verbal understanding.











Anne Marie Schmidt Jun 5, 2018 10:37

## 2018-06-05 NOTE — HHI.GIFU
Subjective


Remarks


Pt in bedside chair


States some nausea this morning but felt much better after eating


RUQ pain is mild


Reports overall she is feeling much better and hopes to make it to her oncology 

appt tomorrow 


 (Jess Gonzalez)





Objective


Vitals I&O





Vital Signs








  Date Time  Temp Pulse Resp B/P (MAP) Pulse Ox O2 Delivery O2 Flow Rate FiO2


 


6/5/18 08:00 98.0 85 19 154/71 (98) 94   


 


6/5/18 04:00 97.8 71 18 100/ 19   


 


6/5/18 03:41  71      


 


6/5/18 00:00 98.4 72 18 134/71 (92) 94   


 


6/4/18 23:48  72      


 


6/4/18 20:00 98.5 80 18 139/65 (89) 95   


 


6/4/18 19:55  78      


 


6/4/18 16:29 97.9 76 16 136/72 (93) 93   


 


6/4/18 13:17  73 18 114/54 (74) 97   


 


6/4/18 12:31 98.1 77 18 144/68 (93) 93   














I/O      


 


 6/4/18 6/4/18 6/4/18 6/5/18 6/5/18 6/5/18





 07:00 15:00 23:00 07:00 15:00 23:00


 


Intake Total 0 ml 400 ml 1000 ml   


 


Balance 0 ml 400 ml 1000 ml   


 


      


 


Intake Oral 0 ml     


 


IV Total   1000 ml   


 


Other  400 ml    


 


# Voids 2 4 1 4  


 


# Bowel Movements 0 1    








Laboratory





Laboratory Tests








Test


  6/4/18


16:03


 


White Blood Count 8.9 


 


Red Blood Count 3.43 


 


Hemoglobin 9.9 


 


Hematocrit 29.0 


 


Mean Corpuscular Volume 84.6 


 


Mean Corpuscular Hemoglobin 28.8 


 


Mean Corpuscular Hemoglobin


Concent 34.1 


 


 


Red Cell Distribution Width 14.5 


 


Platelet Count 428 


 


Mean Platelet Volume 6.8 


 


Neutrophils (%) (Auto) 75.2 


 


Lymphocytes (%) (Auto) 14.3 


 


Monocytes (%) (Auto) 8.9 


 


Eosinophils (%) (Auto) 1.3 


 


Basophils (%) (Auto) 0.3 


 


Neutrophils # (Auto) 6.7 


 


Lymphocytes # (Auto) 1.3 


 


Monocytes # (Auto) 0.8 


 


Eosinophils # (Auto) 0.1 


 


Basophils # (Auto) 0.0 


 


CBC Comment DIFF FINAL 


 


Differential Comment  


 


Blood Urea Nitrogen 10 


 


Creatinine 0.74 


 


Random Glucose 132 


 


Total Protein 6.2 


 


Albumin 2.5 


 


Calcium Level 8.2 


 


Alkaline Phosphatase 79 


 


Aspartate Amino Transf


(AST/SGOT) 16 


 


 


Alanine Aminotransferase


(ALT/SGPT) 12 


 


 


Total Bilirubin 0.4 


 


Sodium Level 140 


 


Potassium Level 3.8 


 


Chloride Level 104 


 


Carbon Dioxide Level 25.2 


 


Anion Gap 11 


 


Estimat Glomerular Filtration


Rate 78 


 








Imaging





Last Impressions








Abdomen/Pelvis CT 6/3/18 1437 Signed





Impressions: 





 CONCLUSION:





 1.  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





  There are pulmonary and hepatic metastases again noted.





 2.  New heterogeneous marked enlargement of the right adrenal gland typical of 





 acute or subacute adrenal hemorrhage.





 3.  Possible pancreatitis and/or duodenitis in the proper clinical setting.





 4.  Colon and appendix within normal limits.





  





 








Physical Exam


HEENT: Normocephalic; atraumatic


CHEST:  Even/unlabored 


CARDIAC:  RRR


ABDOMEN:  Soft, nondistended, mild RUQ tenderness, bowel sounds active 


EXTREMITIES: No clubbing, cyanosis, or edema.


SKIN:  Normal; no rash; no jaundice.


CNS:  Alert and oriented times three.


 (Jess Gonzalez)





Assessment and Plan


Plan


Assessment:


- RUQ pain, tenderness


  Pt reports pain for the past 4-5 days, intermittent, becoming more frequent,


  initially described as sharp, now aching. Denies relation with PO intake,


  bowel movements. 


  Also having some mild RLQ tenderness with deep palpation. Denies 


  constipation, diarrhea, blood in stool. 


  Has never had EGD.


  Last colonoscopy was in 2014 by Dr. Adams, reports of colon polyps was


  due for repeat procedure in June, has not had time. 


  CT abdomen and pelvis WO IV contrast (6/3) --> Possible pancreatitis and/or 


  duodenitis in the proper clinical setting. Colon and appendix WNL.


  Pt reports one history of pancreatitis in 2013, denies ETOH, she is unsure


  of etiology. Of note, denies previous cholecystectomy. 


  Lipase currently WNL


- Melanoma S/P surgical resection and lymph node resection with metastasis


  to the liver and lung- pt followed by oncologist Dr. Burkett, planning on 


  starting immunotherapy this Wednesday. Denies previous chemo or radiation


- History of PE and DVT- new onset A-fib RVR during recent bronchoscopy- 


  has Holter monitor- on Coumadin- INR was 7.9 a week ago- has been off


  Coumadin since, INR currently 2 


- New heterogeneous marked enlargement of the right adrenal gland typical 


  of acute or subacute adrenal hemorrhage on CT- per attending





(6/5) Pt S/P EGD yesterday, reports symptoms improving today. Some mild


  nausea this morning but relief after eating breakfast. RUQ pain is mild but


  overall improving. She hopes to be discharged to make it to her oncology


  appt tomorrow. 





EGD --> Class A esophagitis, medium sized hiatal hernia, mild gastritis in the


  gastric antrum, normal duodenal mucosa in the bulb and second portion of


  the duodenum





Plan:


Continue Protonix


OK to restart anticoagulation from a GI standpoint 


GI will sign off


Have pt follow up with GI after DC 





Pt has been seen and examined by myself and Dr. Chris and this note is written 

on hsi behalf 


 (Jess Gonzalez)


Physician Comments


Agree with above assessment and plan, await biopsy result.


Please notify us if needed again.


 (Beth Chris MD)











Jess Gonzalez Jun 5, 2018 09:56


Beth Chris MD Jun 5, 2018 12:15